# Patient Record
Sex: FEMALE | Race: WHITE | NOT HISPANIC OR LATINO | Employment: FULL TIME | URBAN - METROPOLITAN AREA
[De-identification: names, ages, dates, MRNs, and addresses within clinical notes are randomized per-mention and may not be internally consistent; named-entity substitution may affect disease eponyms.]

---

## 2017-02-02 PROBLEM — Q61.3 POLYCYSTIC KIDNEY DISEASE: Status: ACTIVE | Noted: 2017-02-02

## 2017-02-10 PROBLEM — F41.9 ANXIETY: Status: ACTIVE | Noted: 2017-02-10

## 2017-02-10 PROBLEM — I70.1 RENAL ARTERY STENOSIS: Status: ACTIVE | Noted: 2017-02-10

## 2017-02-10 PROBLEM — I15.0 RENOVASCULAR HYPERTENSION: Status: ACTIVE | Noted: 2017-02-10

## 2017-02-14 ENCOUNTER — PATIENT MESSAGE (OUTPATIENT)
Dept: CARDIOLOGY | Facility: CLINIC | Age: 57
End: 2017-02-14

## 2017-03-27 PROBLEM — I10 HTN (HYPERTENSION): Status: ACTIVE | Noted: 2017-03-27

## 2017-03-27 PROBLEM — R07.9 CHEST PAIN: Status: ACTIVE | Noted: 2017-03-27

## 2017-03-28 PROBLEM — K22.4 ESOPHAGEAL SPASM: Status: ACTIVE | Noted: 2017-03-28

## 2017-04-04 ENCOUNTER — PATIENT MESSAGE (OUTPATIENT)
Dept: CARDIOLOGY | Facility: CLINIC | Age: 57
End: 2017-04-04

## 2017-06-08 ENCOUNTER — OFFICE VISIT (OUTPATIENT)
Dept: CARDIOLOGY | Facility: CLINIC | Age: 57
End: 2017-06-08
Payer: COMMERCIAL

## 2017-06-08 VITALS
HEIGHT: 61 IN | BODY MASS INDEX: 36.21 KG/M2 | WEIGHT: 191.81 LBS | HEART RATE: 75 BPM | SYSTOLIC BLOOD PRESSURE: 145 MMHG | DIASTOLIC BLOOD PRESSURE: 91 MMHG

## 2017-06-08 DIAGNOSIS — Z82.49 FAMILY HISTORY OF CARDIOVASCULAR DISEASE: ICD-10-CM

## 2017-06-08 DIAGNOSIS — R10.13 EPIGASTRIC PAIN: ICD-10-CM

## 2017-06-08 DIAGNOSIS — R00.2 PALPITATIONS: ICD-10-CM

## 2017-06-08 DIAGNOSIS — I15.0 RENOVASCULAR HYPERTENSION: ICD-10-CM

## 2017-06-08 DIAGNOSIS — I70.1 RENAL ARTERY STENOSIS: ICD-10-CM

## 2017-06-08 DIAGNOSIS — R07.9 CHEST PAIN, UNSPECIFIED TYPE: Primary | ICD-10-CM

## 2017-06-08 PROCEDURE — 99204 OFFICE O/P NEW MOD 45 MIN: CPT | Mod: S$GLB,,, | Performed by: INTERNAL MEDICINE

## 2017-06-08 PROCEDURE — 99999 PR PBB SHADOW E&M-EST. PATIENT-LVL III: CPT | Mod: PBBFAC,,, | Performed by: INTERNAL MEDICINE

## 2017-06-08 RX ORDER — PANTOPRAZOLE SODIUM 40 MG/1
40 TABLET, DELAYED RELEASE ORAL DAILY
Qty: 30 TABLET | Refills: 11 | Status: SHIPPED | OUTPATIENT
Start: 2017-06-08 | End: 2018-06-22 | Stop reason: SDUPTHER

## 2017-06-08 NOTE — LETTER
June 8, 2017      Leigh Fuchs MD  [address] [place]  [place] [address]           [facility]  [address] [facility] [address] [place] [address]  Phone: [phone]          Patient: Jazmyne Stuart   MR Number: [id number]   YOB: 1960   Date of Visit: 6/8/2017       Dear Dr. Leigh Fuchs:    Thank you for referring Jazmyne Stuart to me for evaluation. Attached you will find relevant portions of my assessment and plan of care.    If you have questions, please do not hesitate to call me. I look forward to following Jazmyne Stuart along with you.    Sincerely,    Frederick Campbell MD    Enclosure  CC:  No Recipients    If you would like to receive this communication electronically, please contact [email or web address] or [phone] to request more information on [employer] Link access.    For providers and/or their staff who would like to refer a patient to [facility], please contact us through our one-stop-shop provider referral line, [facility] , at [phone].    If you feel you have received this communication in error or would no longer like to receive these types of communications, please e-mail [email or web address]

## 2017-06-08 NOTE — PROGRESS NOTES
Subjective:    Patient ID:  Jazmyne Stuart is a 57 y.o. female who presents for evaluation of Chest Pain (post hosp f/u - [facility] 03/2017; Family HX. ); Hypertension; and Leg Problem (cramps )      HPI   Referred here by Dr. Keith after hsop stay for CP and ?? MARIELA. No further CP since DC. Admits to palpitations occurring w/ and w/o exertion no syncope.  No triggers    Review of Systems   Constitution: Negative for chills, decreased appetite, weakness and night sweats.   HENT: Negative for headaches and nosebleeds.    Eyes: Negative for blurred vision and visual disturbance.   Cardiovascular: Negative for chest pain, claudication, cyanosis, dyspnea on exertion, irregular heartbeat, leg swelling, near-syncope, orthopnea, palpitations, paroxysmal nocturnal dyspnea and syncope.   Respiratory: Negative for cough and shortness of breath.    Endocrine: Negative for polyuria.   Hematologic/Lymphatic: Does not bruise/bleed easily.   Skin: Negative for flushing and rash.   Musculoskeletal: Negative for arthritis, joint pain, muscle cramps and myalgias.   Gastrointestinal: Negative for abdominal pain, change in bowel habit and heartburn.   Genitourinary: Negative for bladder incontinence.   Neurological: Negative for dizziness, light-headedness and loss of balance.   Psychiatric/Behavioral: Negative for altered mental status.        Objective:    Physical Exam   Constitutional: She is oriented to person, place, and time. She appears well-developed and well-nourished.   HENT:   Head: Normocephalic.   Eyes: Conjunctivae are normal.   Neck: Normal range of motion. Neck supple. No JVD present.   Cardiovascular: Normal rate, regular rhythm, normal heart sounds and intact distal pulses.    Pulses:       Carotid pulses are 2+ on the right side, and 2+ on the left side.       Radial pulses are 2+ on the right side, and 2+ on the left side.        Dorsalis pedis pulses are 2+ on the right side, and 2+ on the left side.        Posterior  tibial pulses are 2+ on the right side, and 2+ on the left side.   Pulmonary/Chest: Effort normal and breath sounds normal.   Abdominal: Soft. Bowel sounds are normal.   Musculoskeletal: She exhibits no edema or tenderness.   Neurological: She is alert and oriented to person, place, and time. Gait normal.   Skin: Skin is warm, dry and intact. No cyanosis. Nails show no clubbing.   Psychiatric: She has a normal mood and affect. Her speech is normal and behavior is normal. Thought content normal.   Nursing note and vitals reviewed.            ..    Chemistry        Component Value Date/Time     03/27/2017 1516     09/18/2015 0817    K 4.1 03/27/2017 1516    K 4.3 09/18/2015 0817     03/27/2017 1516     09/18/2015 0817    CO2 32 (H) 03/27/2017 1516    BUN 11 03/27/2017 1516    CREATININE 0.78 03/27/2017 1516    CREATININE 0.78 09/18/2015 0817    GLU 92 03/27/2017 1516        Component Value Date/Time    CALCIUM 10.0 03/27/2017 1516    ALKPHOS 83 03/27/2017 1516    AST 29 03/27/2017 1516    ALT 34 03/27/2017 1516    BILITOT 0.6 03/27/2017 1516            ..  Lab Results   Component Value Date    CHOL 209 (H) 03/27/2017    CHOL 196 09/18/2015     Lab Results   Component Value Date    HDL 70 03/27/2017    HDL 67 (H) 09/18/2015     Lab Results   Component Value Date    LDLCALC 122.2 03/27/2017    LDLCALC 118 09/18/2015     Lab Results   Component Value Date    TRIG 84 03/27/2017    TRIG 55 09/18/2015     Lab Results   Component Value Date    CHOLHDL 33.5 03/27/2017     ..  Lab Results   Component Value Date    WBC 6.78 03/27/2017    HGB 15.1 03/27/2017    HCT 46.0 03/27/2017    MCV 94 03/27/2017     03/27/2017       Test(s) Reviewed  I have reviewed the following in detail:  [x] Stress test   [] Angiography   [x] Echocardiogram   [x] Labs   [] Other:       Assessment:         ICD-10-CM ICD-9-CM   1. Chest pain, unspecified type R07.9 786.50   2. Renovascular hypertension I15.0 405.91   3.  Renal artery stenosis I70.1 440.1     Problem List Items Addressed This Visit     Chest pain - Primary    Renal artery stenosis    Overview     ?? US but negative CTA         Renovascular hypertension      Other Visit Diagnoses    None.          Plan:         Return to clinic 4 months   Aerobic exercise 5x's/wk. Heart healthy diet and risk factor modification.    See labs and med orders.  Event monitor since symptoms occur ~~ 2 weeks had negative hospital w/u.   Discussed avoidence of caffiene, nicotine, MSG, nasal decongestants, ETOH, dark chocolate etc and stimulant use.

## 2017-08-17 DIAGNOSIS — M25.571 ACUTE RIGHT ANKLE PAIN: Primary | ICD-10-CM

## 2017-08-22 ENCOUNTER — HOSPITAL ENCOUNTER (OUTPATIENT)
Dept: RADIOLOGY | Facility: HOSPITAL | Age: 57
Discharge: HOME | End: 2017-08-22
Attending: ORTHOPAEDIC SURGERY
Payer: COMMERCIAL

## 2017-08-22 ENCOUNTER — OFFICE VISIT (OUTPATIENT)
Dept: ORTHOPEDICS | Facility: CLINIC | Age: 57
End: 2017-08-22
Payer: COMMERCIAL

## 2017-08-22 VITALS
HEART RATE: 73 BPM | DIASTOLIC BLOOD PRESSURE: 93 MMHG | BODY MASS INDEX: 36.21 KG/M2 | HEIGHT: 61 IN | WEIGHT: 191.81 LBS | SYSTOLIC BLOOD PRESSURE: 156 MMHG

## 2017-08-22 DIAGNOSIS — M25.371 RIGHT ANKLE INSTABILITY: ICD-10-CM

## 2017-08-22 DIAGNOSIS — M25.571 ACUTE RIGHT ANKLE PAIN: ICD-10-CM

## 2017-08-22 PROCEDURE — 99244 OFF/OP CNSLTJ NEW/EST MOD 40: CPT | Mod: S$GLB,,, | Performed by: ORTHOPAEDIC SURGERY

## 2017-08-22 PROCEDURE — 73610 X-RAY EXAM OF ANKLE: CPT | Mod: 26,RT,, | Performed by: RADIOLOGY

## 2017-08-22 PROCEDURE — 99999 PR PBB SHADOW E&M-EST. PATIENT-LVL III: CPT | Mod: PBBFAC,,, | Performed by: ORTHOPAEDIC SURGERY

## 2017-08-22 PROCEDURE — 73610 X-RAY EXAM OF ANKLE: CPT | Mod: TC,PO,RT

## 2017-08-22 RX ORDER — GABAPENTIN 300 MG/1
300 CAPSULE ORAL 2 TIMES DAILY
Refills: 0 | COMMUNITY
Start: 2017-08-08 | End: 2017-10-03 | Stop reason: ALTCHOICE

## 2017-08-22 NOTE — LETTER
August 22, 2017      Kanu Toth, DPM  [address]  [address]  [facility]  [place] [address]           [facility]  [address] [facility] [address] [place] [address]  Phone: [phone]          Patient: Jazmyne Stuart   MR Number: [id number]   YOB: 1960   Date of Visit: 8/22/2017       Dear Dr. Kanu Toth:    Thank you for referring Jazmyne Stuart to me for evaluation. Attached you will find relevant portions of my assessment and plan of care.    If you have questions, please do not hesitate to call me. I look forward to following Jazmyne Stuart along with you.    Sincerely,    Mauro Tong MD    Enclosure  CC:  No Recipients    If you would like to receive this communication electronically, please contact [email or web address] or [phone] to request more information on [employer] Link access.    For providers and/or their staff who would like to refer a patient to [facility], please contact us through our one-stop-shop provider referral line, [facility] , at [phone].    If you feel you have received this communication in error or would no longer like to receive these types of communications, please e-mail [email or web address]

## 2017-08-22 NOTE — PROGRESS NOTES
"HPI: Jazmyne Stuart is a 57 y.o. female who was referred to me by Dr. Enrique Toth DPM and was seen in consultation today for right ankle pain. She rates her pain as 3/10 today. She says the pain began when she was snorkeling on vacation and she slipped on the rung of the ladder and her foot bent back. The pain is worse with walking and standing. She has tried ankle exercises, brace, cortisone injection and walking boot. She says it does give out. She had an MRI done but it is not available for review today.     PAST MEDICAL/SURGICAL/FAMILY/SOCIAL/ HISTORY: REVIEWED    ALLERGIES/MEDICATIONS: REVIEWED       Review of Systems:     Constitution: Negative.   HEENT: Negative.   Eyes: Negative.   Cardiovascular: Negative.   Respiratory: Negative.   Endocrine: Negative.   Hematologic/Lymphatic: Negative.   Skin: Negative.   Musculoskeletal: Positive for right ankle pain   Gastrointestinal: Negative.   Genitourinary: Negative.   Neurological: Negative.   Psychiatric/Behavioral: Negative.   Allergic/Immunologic: Negative.       PHYSICAL EXAM:  Vitals:    08/22/17 1351   BP: (!) 156/93   Pulse: 73     Ht Readings from Last 1 Encounters:   08/22/17 5' 1" (1.549 m)     Wt Readings from Last 1 Encounters:   08/22/17 87 kg (191 lb 12.8 oz)         GENERAL: Well developed, well nourished, no acute distress.  SKIN: Skin is intact. No atrophy, abrasions or lesions are noted.   Neurological: Normal mental status. Appropriate and conversant. Alert and oriented x 3.  GAIT: Walks with non-antalgic gait.    Right lower extremity compared with LLE:  2+ dorsalis pedis pulse.  Capillary refill < 3 seconds.  Normal range of motion tibiotalar and subtalar joints. pes planus.  5/5 strength EHL, FHL, tibialis anterior, gastrocsoleus, tibialis posterior and peroneals. Sensation to light touch intact sural, saphenous, superficial peroneal and deep peroneal nerves.  No swelling, no ecchymosis or deformity. No lymphadenopathy, no masses or " tumors palpated.  3+ anterior drawer test. non-tender to palpation.     XRAYS:   3 views of right ankle obtained and reviewed today reveal No evidence of fractures or dislocations.       ASSESSMENT:   Right ankle instability      PLAN:  I spent 20 minutes in consulation with the patient today. More than half the time was spent counseling the patient on her condition and the options for operative versus non-operative care.  She is going to drop off her MRI for me to review.   I recommend right ankle arthroscopy and brostom-coulter lateral ligament reconstruction. She is going to f/u with me after her vacation to schedule surgery.

## 2017-09-18 ENCOUNTER — TELEPHONE (OUTPATIENT)
Dept: ORTHOPEDICS | Facility: CLINIC | Age: 57
End: 2017-09-18

## 2017-09-18 NOTE — TELEPHONE ENCOUNTER
----- Message from Shobha Almazan sent at 9/18/2017  2:36 PM CDT -----  Contact: patient  Patient calling in regards to following up to see if the Doctor received her MRI on a disc. Please advise   Call back   Thanks!

## 2017-09-18 NOTE — TELEPHONE ENCOUNTER
Spoke to patient and advised that I will have Dr. Tong look at the MRI tomorrow when she returns to the clinic. Patient states understanding.

## 2017-09-21 ENCOUNTER — PATIENT MESSAGE (OUTPATIENT)
Dept: ORTHOPEDICS | Facility: CLINIC | Age: 57
End: 2017-09-21

## 2017-10-03 ENCOUNTER — OFFICE VISIT (OUTPATIENT)
Dept: ORTHOPEDICS | Facility: CLINIC | Age: 57
End: 2017-10-03
Payer: COMMERCIAL

## 2017-10-03 VITALS
BODY MASS INDEX: 36.06 KG/M2 | DIASTOLIC BLOOD PRESSURE: 104 MMHG | SYSTOLIC BLOOD PRESSURE: 172 MMHG | HEIGHT: 61 IN | HEART RATE: 75 BPM | WEIGHT: 191 LBS

## 2017-10-03 DIAGNOSIS — G89.29 CHRONIC PAIN OF RIGHT ANKLE: ICD-10-CM

## 2017-10-03 DIAGNOSIS — M25.571 CHRONIC PAIN OF RIGHT ANKLE: ICD-10-CM

## 2017-10-03 DIAGNOSIS — M25.371 RIGHT ANKLE INSTABILITY: Primary | ICD-10-CM

## 2017-10-03 PROCEDURE — 99214 OFFICE O/P EST MOD 30 MIN: CPT | Mod: S$GLB,,, | Performed by: ORTHOPAEDIC SURGERY

## 2017-10-03 PROCEDURE — 99999 PR PBB SHADOW E&M-EST. PATIENT-LVL III: CPT | Mod: PBBFAC,,, | Performed by: ORTHOPAEDIC SURGERY

## 2017-10-03 RX ORDER — DIPHENHYDRAMINE HCL 25 MG
50 CAPSULE ORAL NIGHTLY PRN
COMMUNITY
End: 2019-05-08

## 2017-10-03 NOTE — PROGRESS NOTES
"HPI: Jazmyne Stuart is a 57 y.o. female who was referred to me by Dr. Enrique Toth DPM and was seen in consultation today for right ankle pain. She rates her pain as 3/10 today. She says the pain began when she was snorkeling on vacation and she slipped on the rung of the ladder and her foot bent back. The pain is worse with walking and standing. She has tried ankle exercises, brace, cortisone injection and walking boot. She says it does give out.     PAST MEDICAL/SURGICAL/FAMILY/SOCIAL/ HISTORY: REVIEWED    ALLERGIES/MEDICATIONS: REVIEWED       Review of Systems:     Constitution: Negative.   HEENT: Negative.   Eyes: Negative.   Cardiovascular: Negative.   Respiratory: Negative.   Endocrine: Negative.   Hematologic/Lymphatic: Negative.   Skin: Negative.   Musculoskeletal: Positive for right ankle pain   Gastrointestinal: Negative.   Genitourinary: Negative.   Neurological: Negative.   Psychiatric/Behavioral: Negative.   Allergic/Immunologic: Negative.       PHYSICAL EXAM:  Vitals:    10/03/17 0858   BP: (!) 172/104   Pulse: 75     Ht Readings from Last 1 Encounters:   10/03/17 5' 1" (1.549 m)     Wt Readings from Last 1 Encounters:   10/03/17 86.6 kg (191 lb)         GENERAL: Well developed, well nourished, no acute distress.  SKIN: Skin is intact. No atrophy, abrasions or lesions are noted.   Neurological: Normal mental status. Appropriate and conversant. Alert and oriented x 3.  GAIT: Walks with non-antalgic gait.    Right lower extremity compared with LLE:  2+ dorsalis pedis pulse.  Capillary refill < 3 seconds.  Normal range of motion tibiotalar and subtalar joints. pes planus.  5/5 strength EHL, FHL, tibialis anterior, gastrocsoleus, tibialis posterior and peroneals. Sensation to light touch intact sural, saphenous, superficial peroneal and deep peroneal nerves.  No swelling, no ecchymosis or deformity. No lymphadenopathy, no masses or tumors palpated.  3+ anterior drawer test. non-tender to palpation. "     XRAYS:   3 views of right ankle obtained and reviewed today reveal No evidence of fractures or dislocations.       ASSESSMENT:   Right ankle instability      PLAN:  I spent 20 minutes in consulation with the patient today.  I reviewed her MRI which showed chronic tear of ATFL and mild osteoarthritis of the ankle. More than half the time was spent counseling the patient on her condition and the options for operative versus non-operative care.  I recommend right ankle arthroscopy and brostom-coulter lateral ligament reconstruction. I have explained the procedure, including indications, risks, and alternatives.  Jazmyne Stuart gave informed consent and is medically ready for surgery. She is going to call us with a date for surgery.

## 2017-10-04 ENCOUNTER — TELEPHONE (OUTPATIENT)
Dept: ORTHOPEDICS | Facility: CLINIC | Age: 57
End: 2017-10-04

## 2017-10-04 ENCOUNTER — PATIENT MESSAGE (OUTPATIENT)
Dept: ORTHOPEDICS | Facility: CLINIC | Age: 57
End: 2017-10-04

## 2017-10-04 RX ORDER — MUPIROCIN 20 MG/G
1 OINTMENT TOPICAL
Status: CANCELLED | OUTPATIENT
Start: 2017-10-04

## 2017-10-04 NOTE — TELEPHONE ENCOUNTER
----- Message from Ashley Hutchison sent at 10/4/2017 11:03 AM CDT -----  Contact: self   Placed call to pod, patient missed a call from your office. Please call back at [phone]

## 2017-10-09 PROBLEM — G89.29 CHRONIC PAIN OF RIGHT ANKLE: Status: ACTIVE | Noted: 2017-10-09

## 2017-10-09 PROBLEM — M25.571 CHRONIC PAIN OF RIGHT ANKLE: Status: ACTIVE | Noted: 2017-10-09

## 2017-10-17 DIAGNOSIS — M25.571 RIGHT ANKLE PAIN: Primary | ICD-10-CM

## 2017-10-20 ENCOUNTER — TELEPHONE (OUTPATIENT)
Dept: ORTHOPEDICS | Facility: CLINIC | Age: 57
End: 2017-10-20

## 2017-10-20 NOTE — TELEPHONE ENCOUNTER
----- Message from Brandi Castle sent at 10/20/2017  9:56 AM CDT -----  Contact: Shaila / /[facility] // [phone]  Needs orders with patient's name on it and clinicals.  Fax # is [phone].  Please call Shaila.

## 2017-10-20 NOTE — TELEPHONE ENCOUNTER
Spoke with jaquan at [facility], informed her an order for the knee  will be faxed over.

## 2017-10-23 ENCOUNTER — PATIENT MESSAGE (OUTPATIENT)
Dept: ORTHOPEDICS | Facility: CLINIC | Age: 57
End: 2017-10-23

## 2017-10-24 ENCOUNTER — ANESTHESIA EVENT (OUTPATIENT)
Dept: SURGERY | Facility: HOSPITAL | Age: 57
End: 2017-10-24
Payer: COMMERCIAL

## 2017-10-25 ENCOUNTER — PATIENT MESSAGE (OUTPATIENT)
Dept: SURGERY | Facility: HOSPITAL | Age: 57
End: 2017-10-25

## 2017-10-25 ENCOUNTER — SURGERY (OUTPATIENT)
Age: 57
End: 2017-10-25

## 2017-10-25 ENCOUNTER — ANESTHESIA (OUTPATIENT)
Dept: SURGERY | Facility: HOSPITAL | Age: 57
End: 2017-10-25
Payer: COMMERCIAL

## 2017-10-25 ENCOUNTER — TELEPHONE (OUTPATIENT)
Dept: ORTHOPEDICS | Facility: CLINIC | Age: 57
End: 2017-10-25

## 2017-10-25 ENCOUNTER — HOSPITAL ENCOUNTER (OUTPATIENT)
Facility: HOSPITAL | Age: 57
Discharge: HOME | End: 2017-10-25
Attending: ORTHOPAEDIC SURGERY | Admitting: ORTHOPAEDIC SURGERY
Payer: COMMERCIAL

## 2017-10-25 ENCOUNTER — PATIENT MESSAGE (OUTPATIENT)
Dept: ORTHOPEDICS | Facility: CLINIC | Age: 57
End: 2017-10-25

## 2017-10-25 DIAGNOSIS — M25.371 RIGHT ANKLE INSTABILITY: Primary | ICD-10-CM

## 2017-10-25 DIAGNOSIS — M25.571 CHRONIC PAIN OF RIGHT ANKLE: ICD-10-CM

## 2017-10-25 DIAGNOSIS — G89.29 CHRONIC PAIN OF RIGHT ANKLE: ICD-10-CM

## 2017-10-25 PROCEDURE — 37000008 HC ANESTHESIA 1ST 15 MINUTES: Mod: PO | Performed by: ORTHOPAEDIC SURGERY

## 2017-10-25 PROCEDURE — 36000709 HC OR TIME LEV III EA ADD 15 MIN: Mod: PO | Performed by: ORTHOPAEDIC SURGERY

## 2017-10-25 PROCEDURE — 63600175 PHARM REV CODE 636 W HCPCS: Mod: PO | Performed by: ANESTHESIOLOGY

## 2017-10-25 PROCEDURE — D9220A PRA ANESTHESIA: Mod: CRNA,,, | Performed by: NURSE ANESTHETIST, CERTIFIED REGISTERED

## 2017-10-25 PROCEDURE — 27698 REPAIR OF ANKLE LIGAMENT: CPT | Mod: RT,,, | Performed by: ORTHOPAEDIC SURGERY

## 2017-10-25 PROCEDURE — 27201423 OPTIME MED/SURG SUP & DEVICES STERILE SUPPLY: Mod: PO | Performed by: ORTHOPAEDIC SURGERY

## 2017-10-25 PROCEDURE — 25000003 PHARM REV CODE 250: Mod: PO | Performed by: ORTHOPAEDIC SURGERY

## 2017-10-25 PROCEDURE — 63600175 PHARM REV CODE 636 W HCPCS: Mod: PO | Performed by: NURSE ANESTHETIST, CERTIFIED REGISTERED

## 2017-10-25 PROCEDURE — 71000033 HC RECOVERY, INTIAL HOUR: Mod: PO | Performed by: ORTHOPAEDIC SURGERY

## 2017-10-25 PROCEDURE — 29897 ANKLE ARTHROSCOPY/SURGERY: CPT | Mod: 51,RT,, | Performed by: ORTHOPAEDIC SURGERY

## 2017-10-25 PROCEDURE — 63600175 PHARM REV CODE 636 W HCPCS: Mod: PO | Performed by: ORTHOPAEDIC SURGERY

## 2017-10-25 PROCEDURE — C1713 ANCHOR/SCREW BN/BN,TIS/BN: HCPCS | Mod: PO | Performed by: ORTHOPAEDIC SURGERY

## 2017-10-25 PROCEDURE — 25000003 PHARM REV CODE 250: Mod: PO | Performed by: ANESTHESIOLOGY

## 2017-10-25 PROCEDURE — 27200651 HC AIRWAY, LMA: Mod: PO | Performed by: NURSE ANESTHETIST, CERTIFIED REGISTERED

## 2017-10-25 PROCEDURE — 37000009 HC ANESTHESIA EA ADD 15 MINS: Mod: PO | Performed by: ORTHOPAEDIC SURGERY

## 2017-10-25 PROCEDURE — 71000039 HC RECOVERY, EACH ADD'L HOUR: Mod: PO | Performed by: ORTHOPAEDIC SURGERY

## 2017-10-25 PROCEDURE — D9220A PRA ANESTHESIA: Mod: ANES,,, | Performed by: ANESTHESIOLOGY

## 2017-10-25 PROCEDURE — 36000708 HC OR TIME LEV III 1ST 15 MIN: Mod: PO | Performed by: ORTHOPAEDIC SURGERY

## 2017-10-25 DEVICE — ANCHOR 2-0 FIBERWIRE 2.4X8.5MM: Type: IMPLANTABLE DEVICE | Site: ANKLE | Status: FUNCTIONAL

## 2017-10-25 DEVICE — LIGAMENT INTERNALBRACE: Type: IMPLANTABLE DEVICE | Site: ANKLE | Status: FUNCTIONAL

## 2017-10-25 RX ORDER — LIDOCAINE HYDROCHLORIDE 10 MG/ML
1 INJECTION, SOLUTION EPIDURAL; INFILTRATION; INTRACAUDAL; PERINEURAL ONCE
Status: COMPLETED | OUTPATIENT
Start: 2017-10-25 | End: 2017-10-25

## 2017-10-25 RX ORDER — LIDOCAINE HCL/PF 100 MG/5ML
SYRINGE (ML) INTRAVENOUS
Status: DISCONTINUED | OUTPATIENT
Start: 2017-10-25 | End: 2017-10-25

## 2017-10-25 RX ORDER — FENTANYL CITRATE 50 UG/ML
100 INJECTION, SOLUTION INTRAMUSCULAR; INTRAVENOUS ONCE
Status: COMPLETED | OUTPATIENT
Start: 2017-10-25 | End: 2017-10-25

## 2017-10-25 RX ORDER — MIDAZOLAM HYDROCHLORIDE 1 MG/ML
INJECTION, SOLUTION INTRAMUSCULAR; INTRAVENOUS
Status: DISCONTINUED | OUTPATIENT
Start: 2017-10-25 | End: 2017-10-25

## 2017-10-25 RX ORDER — SODIUM CHLORIDE, SODIUM LACTATE, POTASSIUM CHLORIDE, CALCIUM CHLORIDE 600; 310; 30; 20 MG/100ML; MG/100ML; MG/100ML; MG/100ML
INJECTION, SOLUTION INTRAVENOUS CONTINUOUS
Status: DISCONTINUED | OUTPATIENT
Start: 2017-10-25 | End: 2017-10-25 | Stop reason: HOSPADM

## 2017-10-25 RX ORDER — ACETAMINOPHEN 10 MG/ML
INJECTION, SOLUTION INTRAVENOUS
Status: DISCONTINUED | OUTPATIENT
Start: 2017-10-25 | End: 2017-10-25

## 2017-10-25 RX ORDER — FENTANYL CITRATE 50 UG/ML
INJECTION, SOLUTION INTRAMUSCULAR; INTRAVENOUS
Status: DISCONTINUED | OUTPATIENT
Start: 2017-10-25 | End: 2017-10-25

## 2017-10-25 RX ORDER — OXYCODONE AND ACETAMINOPHEN 10; 325 MG/1; MG/1
1 TABLET ORAL EVERY 4 HOURS PRN
Qty: 42 TABLET | Refills: 0 | Status: SHIPPED | OUTPATIENT
Start: 2017-10-25 | End: 2017-11-28

## 2017-10-25 RX ORDER — OXYCODONE HYDROCHLORIDE 5 MG/1
5 TABLET ORAL ONCE
Status: COMPLETED | OUTPATIENT
Start: 2017-10-25 | End: 2017-10-25

## 2017-10-25 RX ORDER — KETOROLAC TROMETHAMINE 30 MG/ML
INJECTION, SOLUTION INTRAMUSCULAR; INTRAVENOUS
Status: DISCONTINUED | OUTPATIENT
Start: 2017-10-25 | End: 2017-10-25

## 2017-10-25 RX ORDER — PROPOFOL 10 MG/ML
VIAL (ML) INTRAVENOUS
Status: DISCONTINUED | OUTPATIENT
Start: 2017-10-25 | End: 2017-10-25

## 2017-10-25 RX ORDER — MUPIROCIN 20 MG/G
1 OINTMENT TOPICAL
Status: COMPLETED | OUTPATIENT
Start: 2017-10-25 | End: 2017-10-25

## 2017-10-25 RX ORDER — ONDANSETRON 2 MG/ML
INJECTION INTRAMUSCULAR; INTRAVENOUS
Status: DISCONTINUED | OUTPATIENT
Start: 2017-10-25 | End: 2017-10-25

## 2017-10-25 RX ADMIN — MIDAZOLAM HYDROCHLORIDE 1 MG: 1 INJECTION, SOLUTION INTRAMUSCULAR; INTRAVENOUS at 07:10

## 2017-10-25 RX ADMIN — FENTANYL CITRATE 100 MCG: 50 INJECTION INTRAMUSCULAR; INTRAVENOUS at 09:10

## 2017-10-25 RX ADMIN — DEXTROSE 2 G: 50 INJECTION, SOLUTION INTRAVENOUS at 07:10

## 2017-10-25 RX ADMIN — LIDOCAINE HYDROCHLORIDE 100 MG: 20 INJECTION PARENTERAL at 07:10

## 2017-10-25 RX ADMIN — SODIUM CHLORIDE, SODIUM LACTATE, POTASSIUM CHLORIDE, AND CALCIUM CHLORIDE: .6; .31; .03; .02 INJECTION, SOLUTION INTRAVENOUS at 08:10

## 2017-10-25 RX ADMIN — OXYCODONE HYDROCHLORIDE 5 MG: 5 TABLET ORAL at 09:10

## 2017-10-25 RX ADMIN — KETOROLAC TROMETHAMINE 30 MG: 30 INJECTION, SOLUTION INTRAMUSCULAR; INTRAVENOUS at 08:10

## 2017-10-25 RX ADMIN — ACETAMINOPHEN 1000 MG: 10 INJECTION, SOLUTION INTRAVENOUS at 08:10

## 2017-10-25 RX ADMIN — ONDANSETRON 4 MG: 2 INJECTION, SOLUTION INTRAMUSCULAR; INTRAVENOUS at 08:10

## 2017-10-25 RX ADMIN — MUPIROCIN 1 G: 20 OINTMENT TOPICAL at 07:10

## 2017-10-25 RX ADMIN — PROPOFOL 200 MG: 10 INJECTION, EMULSION INTRAVENOUS at 07:10

## 2017-10-25 RX ADMIN — LIDOCAINE HYDROCHLORIDE: 10 INJECTION, SOLUTION EPIDURAL; INFILTRATION; INTRACAUDAL; PERINEURAL at 07:10

## 2017-10-25 RX ADMIN — SODIUM CHLORIDE, SODIUM LACTATE, POTASSIUM CHLORIDE, AND CALCIUM CHLORIDE: .6; .31; .03; .02 INJECTION, SOLUTION INTRAVENOUS at 07:10

## 2017-10-25 RX ADMIN — FENTANYL CITRATE 100 MCG: 50 INJECTION, SOLUTION INTRAMUSCULAR; INTRAVENOUS at 07:10

## 2017-10-25 NOTE — OR NURSING
"Dr. Fields at bedside evaluating patient's pain. Patient reports"mild" right ankle pain and mostly c/o right thigh pain. Bari noticed on right thigh from OR tourniquet.  "

## 2017-10-25 NOTE — ANESTHESIA PREPROCEDURE EVALUATION
10/25/2017  Jazmyne Stuart is a 57 y.o., female.    Anesthesia Evaluation    I have reviewed the Patient Summary Reports.    I have reviewed the Nursing Notes.   I have reviewed the Medications.     Review of Systems  Anesthesia Hx:  No problems with previous Anesthesia    Social:  Former Smoker, Social Alcohol Use    Cardiovascular:   Hypertension Denies CAD.    Denies CABG/stent.   Denies Angina. ECG has been reviewed. 3/2017 echo:    CONCLUSIONS     1 - Normal left ventricular systolic function (EF 55-60%).     2 - Left ventricular diastolic dysfunction.     3 - Normal right ventricular systolic function .     4 - The estimated PA systolic pressure is 21 mmHg.     5 - Mild tricuspid regurgitation.       3/28/17:  NEG NM perfusion scan   Renal/:   Chronic Renal Disease, renal hypertension Polycystic kidney disease   Musculoskeletal:   Arthritis     Psych:   anxiety NIMESH         Physical Exam  General:  Obesity    Airway/Jaw/Neck:  Airway Findings: Mouth Opening: Normal Mallampati: III  Improves to II with phonation.  TM Distance: 4 - 6 cm  Jaw/Neck Findings:  Neck ROM: Extension Decreased, Mild       Chest/Lungs:  Chest/Lungs Findings: Clear to auscultation, Normal Respiratory Rate     Heart/Vascular:  Heart Findings: Rate: Normal  Rhythm: Regular Rhythm        Mental Status:  Mental Status Findings:  Cooperative, Alert and Oriented         Anesthesia Plan  Type of Anesthesia, risks & benefits discussed:  Anesthesia Type:  general  Patient's Preference:   Intra-op Monitoring Plan: standard ASA monitors  Intra-op Monitoring Plan Comments:   Post Op Pain Control Plan: multimodal analgesia and peripheral nerve block  Post Op Pain Control Plan Comments:   Induction:   IV  Beta Blocker:  Patient is not currently on a Beta-Blocker (No further documentation required).       Informed Consent: Patient understands  risks and agrees with Anesthesia plan.  Questions answered. Anesthesia consent signed with patient.  ASA Score: 3     Day of Surgery Review of History & Physical: I have interviewed and examined the patient. I have reviewed the patient's H&P dated:  There are no significant changes.          Ready For Surgery From Anesthesia Perspective.

## 2017-10-25 NOTE — OP NOTE
OPERATIVE NOTE     DATE: 10/25/2017   TIME: 8:54 AM     PATIENT NAME: Jazmyne Stuart    PRE-OPERATIVE DIAGNOSIS: 1) Right lateral ankle instability 2) Right ankle chronic pain    POST-OPERATIVE DIAGNOSIS: 1) Right lateral ankle instability 2) Right ankle chronic pain    PROCEDURE: 1. Right ankle arthroscopy with limited debridement 2. Right Ankle Brostrom-Wallis lateral ligament reconstruction     SURGEON: Mauro Tong MD     ANESTHESIA TYPE: GETA     SPECIMENS SENT: NONE     COMPLICATIONS: NONE     BLOOD LOSS: <5cc     ASSISTANT: JAVIER Maddox             Procedure in detail: After appropriate informed consent was obtained. The patient was placed in the supine position on the operating table. The Right lower extremity was placed in the well leg bautista with the knee at 90 degrees. The Right lower extremity was then prepped and draped in the usual sterile fashion. Tourniquet was raised to 300 mmHg after esmarch exsanguination of the limb. The foot was placed in the ankle distractor and the ankle was distracted. The anteromedial portal for ankle arthroscopy was established using an 18 gauge spinal needle, injecting and aspirating back 10 cc of normal saline. A very small incision was made using a #11 blade just medial to the tibialis anterior tendon in line with the spinal needle insertion site through the skin only. A small hemostat was introduced and using to spread down to the capsule. The blunt trocar was placed into the joint. The camera was inserted and the ankle joint was inspected medially and laterally. There was good visualization of the joint with mild synovitis.   The anterolateral portal was established using 18 gauge spinal needle which was inserted into the lateral joint space while visualized using the camera. A very small incision as made using a #11 blade in line with the spinal needle insertion site through the skin only. The Arthrex 2.4 mm shaver was placed and used perform debridement of  synovitis within the ankle. No osteochondral defects were found. The camera was switched to the lateral portal where the joint was again inspected. Last look was made through the medial and lateral gutters and the posterior ankle and then the trocar and the cameras were removed.   The patient's leg was taken out of the well leg bautista.      Next, a 5 cm curvilinear incision was made using a #15 blade overlying the distal fibula. Attention was then turned to the anterior portion of the incision where the proximal edge of the inferior extensor retinaculum was then identified, carefully dissected and mobilized. The lateral ankle capsule was then identified along with the attenuated anterior talofibular ligament. The ligament was quite thin. The calcaneofibular ligament was identified at the tip of the distal fibula with inferior retraction of the peroneal tendons. The capsule was then divided from the distal fibula and extended about one centimeter proximally via sub periosteal elevation with a #15 blade.     The lateral ankle gutter and the lateral talar dome were also inspected for loose bodies and osteochondral injuries. Next, the distal tip of the fibula at the ATFL and CFL footprint was debrided with a curette and surgical blade, then a wronger was used to lightly decorticate the bone in order to provide a bleeding bony bed for healing and suture anchor placement. Using the Arthrex 1.8-mm drill two drill holes were then made at the ATFL and CFL insertion site on the distal fibula. Care was taken to make sure that the drill hole tunnels did not intersect with each other. Next drill hole was placed in the distal fibula for the 3.5 mm anchor for the internal brace and drill was placed in the lateral neck of the talus for the 4.75mm internal brace.  Two 2.4mm Mini Bio-SutureTak anchors were placed into the holes. The distal suture was placed into the CFL in a horizontal mattress fashion. The proximal suturewas placed  into the ATFL ligament using a horizontal mattress and these were both tied down with the foot in dorsiflexion and eversion.     The anchor for the internal brace was then placed into the lateral talar neck and a hemostat was placed under the suture to prevent overtightening.  The extensor retinaculum was then advanced and repaired to the periosteum of the distal fibula to reinforce the repair using interrupted 2.0 ethibond sutures. This is also done with the foot in neutral dorsiflexion and eversion.  The peroneal tendon sheath was repaired using 2.0 ethibone suture to prevent subluxation of the tendons.  The incision was irrigated with normal saline. Tourniquet was let down. Adequate hemostasis was achieved using bovie cautery. The deep layer of the incision was re-approximated using 0-monocyrl in an interrupted fashion. The subcutaneous layer was re-approximated using 3.0-monocyrl in an interrupted fashion. The skin was re-approximated using 3.0 nylon in a running fashion. Sterile dressing using xeroform, bacitracin, 4x4s, cast padding, and posterior splint and stirrups with the foot in neutral and eversion were placed. Patient tolerated the procedure well without complications. A well-padded posterior splint and stirrups with the foot in neutral and eversion was placed. I was present and scrubbed for the entire case.

## 2017-10-25 NOTE — PLAN OF CARE
Vss, jadyn po fluids, denies pain, patient reports has knee roller and crutches at home . IV removed, catheter intact. Discharge instructions provided and states understanding. States ready to go home.  Discharged from facility with family.

## 2017-10-25 NOTE — BRIEF OP NOTE
OPERATIVE NOTE     DATE: 10/25/2017   TIME: 8:54 AM     PATIENT NAME: Jazmyne Stuart    PRE-OPERATIVE DIAGNOSIS: 1) Right lateral ankle instability 2) Right ankle chronic pain    POST-OPERATIVE DIAGNOSIS: 1) Right lateral ankle instability 2) Right ankle chronic pain    PROCEDURE: 1) Right ankle arthroscopy with limited debridement 2) Right Ankle Brostrom-Wallis lateral ligament reconstruction     SURGEON: Mauro Tong MD     ANESTHESIA TYPE: GETA     SPECIMENS SENT: NONE     COMPLICATIONS: NONE     BLOOD LOSS: <5 cc     ASSISTANT: JAVIER Maddox

## 2017-10-25 NOTE — DISCHARGE SUMMARY
[facility]  Discharge Note  Short Stay    Admit Date: 10/25/2017    Discharge Date and Time: 10/25/2017 9:06 AM     Attending Physician: Mauro Tong MD     Discharge Provider: Mauro Tong    Diagnoses:  Active Hospital Problems    Diagnosis  POA    *Right ankle instability [M25.371]  Yes      Resolved Hospital Problems    Diagnosis Date Resolved POA   No resolved problems to display.       Discharged Condition: good    Hospital Course: Patient was admitted for an outpatient procedure and tolerated the procedure well with no complications.    Final Diagnoses: Same as principal problem.    Disposition: Home or Self Care    Follow up/Patient Instructions:    Medications:  Reconciled Home Medications:   Current Discharge Medication List      START taking these medications    Details   oxyCODONE-acetaminophen (PERCOCET)  mg per tablet Take 1 tablet by mouth every 4 (four) hours as needed for Pain.  Qty: 42 tablet, Refills: 0         CONTINUE these medications which have NOT CHANGED    Details   buPROPion (WELLBUTRIN) 75 MG tablet Take 75 mg by mouth Daily.      clonazePAM (KLONOPIN) 0.5 MG tablet Take 1 tablet by mouth  nightly as needed for  anxiety  Qty: 90 tablet, Refills: 0      diphenhydrAMINE (BENADRYL) 25 mg capsule Take 50 mg by mouth nightly as needed for Insomnia.      DIVIGEL 0.25 mg (0.1 %) GlPk       losartan (COZAAR) 100 MG tablet Take 1 tablet (100 mg total) by mouth once daily.  Qty: 30 tablet, Refills: 11    Associated Diagnoses: Essential hypertension      NAPROXEN SOD/DIPHENHYDRAMINE (ALEVE PM ORAL) Take 1 tablet by mouth daily as needed.      pantoprazole (PROTONIX) 40 MG tablet Take 1 tablet (40 mg total) by mouth once daily.  Qty: 30 tablet, Refills: 11    Associated Diagnoses: Epigastric pain      progesterone (PROMETRIUM) 100 MG capsule              Discharge Procedure Orders  CRUTCHES FOR HOME USE   Order Specific Question Answer Comments   Type: Axillary    Height:  "5' 1" (1.549 m)    Weight: 83.9 kg (185 lb)    Length of need (1-99 months): 2      Diet general     Call MD for:  temperature >100.4     Call MD for:  persistent nausea and vomiting     Call MD for:  severe uncontrolled pain     Call MD for:  difficulty breathing, headache or visual disturbances     Call MD for:  redness, tenderness, or signs of infection (pain, swelling, redness, odor or green/yellow discharge around incision site)     Call MD for:  hives     Call MD for:  persistent dizziness or light-headedness     Call MD for:  extreme fatigue     Keep surgical extremity elevated     No driving, operating heavy equipment or signing legal documents while taking pain medication     Non weight bearing     Leave dressing on - Keep it clean, dry, and intact until clinic visit       Follow-up Information     Mauro Tong MD On 11/7/2017.    Specialty:  Orthopedic Surgery  Contact information:  [address] [place] [address]  [phone]                   Discharge Procedure Orders (must include Diet, Follow-up, Activity):    Discharge Procedure Orders (must include Diet, Follow-up, Activity)  CRUTCHES FOR HOME USE   Order Specific Question Answer Comments   Type: Axillary    Height: 5' 1" (1.549 m)    Weight: 83.9 kg (185 lb)    Length of need (1-99 months): 2      Diet general     Call MD for:  temperature >100.4     Call MD for:  persistent nausea and vomiting     Call MD for:  severe uncontrolled pain     Call MD for:  difficulty breathing, headache or visual disturbances     Call MD for:  redness, tenderness, or signs of infection (pain, swelling, redness, odor or green/yellow discharge around incision site)     Call MD for:  hives     Call MD for:  persistent dizziness or light-headedness     Call MD for:  extreme fatigue     Keep surgical extremity elevated     No driving, operating heavy equipment or signing legal documents while taking pain medication     Non weight bearing     Leave dressing on " - Keep it clean, dry, and intact until clinic visit

## 2017-10-25 NOTE — ANESTHESIA POSTPROCEDURE EVALUATION
"Anesthesia Post Evaluation    Patient: Jazmyne Stuart    Procedure(s) Performed: Procedure(s) (LRB):  ARTHROSCOPY-ANKLE (Right)  REPAIR-LIGAMENT (brostrom coulter) (Right)    Final Anesthesia Type: general  Patient location during evaluation: PACU  Patient participation: Yes- Able to Participate  Level of consciousness: awake and alert and oriented  Post-procedure vital signs: reviewed and stable  Pain management: adequate  Airway patency: patent  PONV status at discharge: No PONV  Anesthetic complications: no      Cardiovascular status: hemodynamically stable  Respiratory status: unassisted, spontaneous ventilation and room air  Hydration status: euvolemic  Follow-up not needed.        Visit Vitals  BP (!) 146/77   Pulse 70   Temp 36.2 °C (97.2 °F) (Skin)   Resp 16   Ht 5' 1" (1.549 m)   Wt 83.9 kg (185 lb)   LMP  (Exact Date)   SpO2 100%   Breastfeeding? No   BMI 34.96 kg/m²       Pain/Tanmay Score: Pain Assessment Performed: Yes (10/25/2017  9:42 AM)  Presence of Pain: complains of pain/discomfort (10/25/2017  9:42 AM)  Pain Rating Prior to Med Admin: 5 (10/25/2017  9:20 AM)  Pain Rating Post Med Admin: 4 (10/25/2017  9:28 AM)  Tanmay Score: 10 (10/25/2017  9:42 AM)      "

## 2017-10-25 NOTE — H&P
"HPI: Jazmyne Stuart is a 57 y.o. female who was referred to me by Dr. Enrique Toth DPM and was seen in consultation today for right ankle pain. She rates her pain as 3/10 today. She says the pain began when she was snorkeling on vacation and she slipped on the rung of the ladder and her foot bent back. The pain is worse with walking and standing. She has tried ankle exercises, brace, cortisone injection and walking boot. She says it does give out.      PAST MEDICAL/SURGICAL/FAMILY/SOCIAL/ HISTORY: REVIEWED    ALLERGIES/MEDICATIONS: REVIEWED         Review of Systems:     Constitution: Negative.   HEENT: Negative.   Eyes: Negative.   Cardiovascular: Negative.   Respiratory: Negative.   Endocrine: Negative.   Hematologic/Lymphatic: Negative.   Skin: Negative.   Musculoskeletal: Positive for right ankle pain   Gastrointestinal: Negative.   Genitourinary: Negative.   Neurological: Negative.   Psychiatric/Behavioral: Negative.   Allergic/Immunologic: Negative.         PHYSICAL EXAM:      Vitals:     10/03/17 0858   BP: (!) 172/104   Pulse: 75          Ht Readings from Last 1 Encounters:   10/03/17 5' 1" (1.549 m)          Wt Readings from Last 1 Encounters:   10/03/17 86.6 kg (191 lb)            GENERAL: Well developed, well nourished, no acute distress.  SKIN: Skin is intact. No atrophy, abrasions or lesions are noted.   Neurological: Normal mental status. Appropriate and conversant. Alert and oriented x 3.  GAIT: Walks with non-antalgic gait.     Right lower extremity compared with LLE:  2+ dorsalis pedis pulse.  Capillary refill < 3 seconds.  Normal range of motion tibiotalar and subtalar joints. pes planus.  5/5 strength EHL, FHL, tibialis anterior, gastrocsoleus, tibialis posterior and peroneals. Sensation to light touch intact sural, saphenous, superficial peroneal and deep peroneal nerves.  No swelling, no ecchymosis or deformity. No lymphadenopathy, no masses or tumors palpated.  3+ anterior drawer test. " non-tender to palpation.      XRAYS:   3 views of right ankle obtained and reviewed today reveal No evidence of fractures or dislocations.         ASSESSMENT:   Right ankle instability       PLAN:  I spent 20 minutes in consulation with the patient today.  I reviewed her MRI which showed chronic tear of ATFL and mild osteoarthritis of the ankle. More than half the time was spent counseling the patient on her condition and the options for operative versus non-operative care.  I recommend right ankle arthroscopy and brostom-coulter lateral ligament reconstruction. I have explained the procedure, including indications, risks, and alternatives.  Jazmyne Stuart gave informed consent and is medically ready for surgery. She is going to call us with a date for surgery.

## 2017-10-25 NOTE — TELEPHONE ENCOUNTER
Jorge Rodriguez , I had surgery this morning , having nausea can I get something call in for that.

## 2017-10-25 NOTE — TRANSFER OF CARE
"Anesthesia Transfer of Care Note    Patient: Jazmyne Stuart    Procedure(s) Performed: Procedure(s) (LRB):  ARTHROSCOPY-ANKLE (Right)  REPAIR-LIGAMENT (brostrom coulter) (Right)    Patient location: PACU    Anesthesia Type: general    Transport from OR: Transported from OR on 2-3 L/min O2 by NC with adequate spontaneous ventilation    Post pain: adequate analgesia    Post assessment: no apparent anesthetic complications    Post vital signs: stable    Level of consciousness: awake and sedated    Nausea/Vomiting: no nausea/vomiting    Complications: none    Transfer of care protocol was followed      Last vitals:   Visit Vitals  /79 (BP Location: Right arm, Patient Position: Lying)   Pulse 81   Temp 36.6 °C (97.9 °F) (Skin)   Resp 18   Ht 5' 1" (1.549 m)   Wt 83.9 kg (185 lb)   LMP  (Exact Date)   SpO2 96%   Breastfeeding? No   BMI 34.96 kg/m²     "

## 2017-10-25 NOTE — DISCHARGE INSTRUCTIONS

## 2017-10-26 VITALS
BODY MASS INDEX: 34.93 KG/M2 | HEART RATE: 70 BPM | HEIGHT: 61 IN | RESPIRATION RATE: 16 BRPM | DIASTOLIC BLOOD PRESSURE: 77 MMHG | SYSTOLIC BLOOD PRESSURE: 146 MMHG | WEIGHT: 185 LBS | OXYGEN SATURATION: 100 % | TEMPERATURE: 97 F

## 2017-10-30 ENCOUNTER — TELEPHONE (OUTPATIENT)
Dept: ORTHOPEDICS | Facility: CLINIC | Age: 57
End: 2017-10-30

## 2017-10-30 NOTE — TELEPHONE ENCOUNTER
----- Message from Leticia Rodgers sent at 10/30/2017  4:11 PM CDT -----  Contact: Shaila/[facility]/[phone]  Shaila is requesting clinic notes post sx be faxed to [phone].

## 2017-11-09 DIAGNOSIS — M25.371 RIGHT ANKLE INSTABILITY: Primary | ICD-10-CM

## 2017-11-10 ENCOUNTER — HOSPITAL ENCOUNTER (OUTPATIENT)
Dept: RADIOLOGY | Facility: HOSPITAL | Age: 57
Discharge: HOME | End: 2017-11-10
Attending: ORTHOPAEDIC SURGERY
Payer: COMMERCIAL

## 2017-11-10 ENCOUNTER — OFFICE VISIT (OUTPATIENT)
Dept: ORTHOPEDICS | Facility: CLINIC | Age: 57
End: 2017-11-10
Payer: COMMERCIAL

## 2017-11-10 VITALS — HEIGHT: 61 IN | BODY MASS INDEX: 34.93 KG/M2 | WEIGHT: 185 LBS

## 2017-11-10 DIAGNOSIS — M25.571 CHRONIC PAIN OF RIGHT ANKLE: ICD-10-CM

## 2017-11-10 DIAGNOSIS — M25.371 RIGHT ANKLE INSTABILITY: Primary | ICD-10-CM

## 2017-11-10 DIAGNOSIS — G89.29 CHRONIC PAIN OF RIGHT ANKLE: ICD-10-CM

## 2017-11-10 DIAGNOSIS — M25.371 RIGHT ANKLE INSTABILITY: ICD-10-CM

## 2017-11-10 PROCEDURE — 73610 X-RAY EXAM OF ANKLE: CPT | Mod: 26,RT,, | Performed by: RADIOLOGY

## 2017-11-10 PROCEDURE — 29405 APPL SHORT LEG CAST: CPT | Mod: 58,RT,S$GLB, | Performed by: ORTHOPAEDIC SURGERY

## 2017-11-10 PROCEDURE — 73610 X-RAY EXAM OF ANKLE: CPT | Mod: TC,PO,RT

## 2017-11-10 PROCEDURE — 99024 POSTOP FOLLOW-UP VISIT: CPT | Mod: S$GLB,,, | Performed by: ORTHOPAEDIC SURGERY

## 2017-11-10 PROCEDURE — 99999 PR PBB SHADOW E&M-EST. PATIENT-LVL II: CPT | Mod: PBBFAC,,, | Performed by: ORTHOPAEDIC SURGERY

## 2017-11-10 NOTE — PROGRESS NOTES
Subjective:      Patient ID: Jazmyne Stuart is a 57 y.o. female.    Chief Complaint: Post-op Evaluation of the Right Ankle and Post-op Evaluation (s/p ankle ATS with debridement; vin coulter 10/25/17)    Doing well today. She rates her pain as 0/10 today.   Social History     Occupational History    Not on file.     Social History Main Topics    Smoking status: Former Smoker     Packs/day: 0.50     Quit date: 1/10/2017    Smokeless tobacco: Never Used    Alcohol use 0.6 oz/week     1 Cans of beer per week    Drug use: Unknown    Sexual activity: Not on file            Objective:    Ortho Exam     Neurovascularly intact, incisions well healed, mild swelling, sutures are intact.  No signs of infection.    Assessment:       1. Right ankle instability    2. Chronic pain of right ankle          Plan:       Short leg cast applied. Non-weightbearing. F/u 2 weeks, no  Xray.

## 2017-11-15 ENCOUNTER — PATIENT MESSAGE (OUTPATIENT)
Dept: ORTHOPEDICS | Facility: CLINIC | Age: 57
End: 2017-11-15

## 2017-11-28 ENCOUNTER — OFFICE VISIT (OUTPATIENT)
Dept: ORTHOPEDICS | Facility: CLINIC | Age: 57
End: 2017-11-28
Payer: COMMERCIAL

## 2017-11-28 VITALS
WEIGHT: 185 LBS | HEART RATE: 68 BPM | BODY MASS INDEX: 34.93 KG/M2 | SYSTOLIC BLOOD PRESSURE: 158 MMHG | HEIGHT: 61 IN | DIASTOLIC BLOOD PRESSURE: 94 MMHG

## 2017-11-28 DIAGNOSIS — M25.371 RIGHT ANKLE INSTABILITY: Primary | ICD-10-CM

## 2017-11-28 DIAGNOSIS — G89.29 CHRONIC PAIN OF RIGHT ANKLE: ICD-10-CM

## 2017-11-28 DIAGNOSIS — M25.571 CHRONIC PAIN OF RIGHT ANKLE: ICD-10-CM

## 2017-11-28 PROCEDURE — 99999 PR PBB SHADOW E&M-EST. PATIENT-LVL III: CPT | Mod: PBBFAC,,, | Performed by: ORTHOPAEDIC SURGERY

## 2017-11-28 PROCEDURE — 99024 POSTOP FOLLOW-UP VISIT: CPT | Mod: S$GLB,,, | Performed by: ORTHOPAEDIC SURGERY

## 2017-11-28 PROCEDURE — 97760 ORTHOTIC MGMT&TRAING 1ST ENC: CPT | Mod: S$GLB,,, | Performed by: ORTHOPAEDIC SURGERY

## 2017-12-04 NOTE — PROGRESS NOTES
Subjective:      Patient ID: Jazmyne Stuart is a 57 y.o. female.    Chief Complaint: Post-op Evaluation of the Right Ankle (DOS: 10-25-17/Ankle ATS; with debridement; vin coulter )    Doing very well today. She rates her pain as 0/10 today.   Social History     Occupational History    Not on file.     Social History Main Topics    Smoking status: Former Smoker     Packs/day: 0.50     Quit date: 1/10/2017    Smokeless tobacco: Never Used    Alcohol use 0.6 oz/week     1 Cans of beer per week    Drug use: Unknown    Sexual activity: Not on file            Objective:    Ortho Exam     Neurovascularly intact, incisions well healed, mild swelling.  No signs of infection. Negative anterior drawer test.     Assessment:       1. Right ankle instability    2. Chronic pain of right ankle          Plan:       We performed a custom orthotic/brace adjustment, fitting and training with the patient today. The patient demonstrated understanding and proper care. This was performed for 15 minutes.  Short boot was given.  PT 2/6.   F/u 2 weeks, no  Xray.

## 2017-12-12 ENCOUNTER — OFFICE VISIT (OUTPATIENT)
Dept: ORTHOPEDICS | Facility: CLINIC | Age: 57
End: 2017-12-12
Payer: COMMERCIAL

## 2017-12-12 VITALS
BODY MASS INDEX: 34.93 KG/M2 | WEIGHT: 185 LBS | DIASTOLIC BLOOD PRESSURE: 92 MMHG | HEIGHT: 61 IN | HEART RATE: 82 BPM | SYSTOLIC BLOOD PRESSURE: 147 MMHG

## 2017-12-12 DIAGNOSIS — M25.371 RIGHT ANKLE INSTABILITY: Primary | ICD-10-CM

## 2017-12-12 DIAGNOSIS — G89.29 CHRONIC PAIN OF RIGHT ANKLE: ICD-10-CM

## 2017-12-12 DIAGNOSIS — M25.571 CHRONIC PAIN OF RIGHT ANKLE: ICD-10-CM

## 2017-12-12 PROCEDURE — 99024 POSTOP FOLLOW-UP VISIT: CPT | Mod: S$GLB,,, | Performed by: ORTHOPAEDIC SURGERY

## 2017-12-12 PROCEDURE — 99999 PR PBB SHADOW E&M-EST. PATIENT-LVL III: CPT | Mod: PBBFAC,,, | Performed by: ORTHOPAEDIC SURGERY

## 2017-12-12 NOTE — PROGRESS NOTES
Subjective:      Patient ID: Jazmyne Stuart is a 57 y.o. female.    Chief Complaint: Post-op Evaluation of the Right Ankle and Post-op Evaluation (s/p ankle ATS with debridement; vin coulter 10/25/17)    Doing very well today. She rates her pain as 0/10 today. She is doing well with PT.   Social History     Occupational History    Not on file.     Social History Main Topics    Smoking status: Former Smoker     Packs/day: 0.50     Quit date: 1/10/2017    Smokeless tobacco: Never Used    Alcohol use 0.6 oz/week     1 Cans of beer per week    Drug use: Unknown    Sexual activity: Not on file            Objective:    Ortho Exam     RLE: Neurovascularly intact, incisions well healed, mild swelling.  No signs of infection. 1+ anterior drawer test which is comparable with the left ankle.    Assessment:       1. Right ankle instability    2. Chronic pain of right ankle          Plan:     Lace up ankle brace was given. Ok to transition out of the boot. F/u 4 weeks, no  Xray.

## 2018-01-12 ENCOUNTER — PATIENT MESSAGE (OUTPATIENT)
Dept: ORTHOPEDICS | Facility: CLINIC | Age: 58
End: 2018-01-12

## 2018-01-15 ENCOUNTER — PATIENT MESSAGE (OUTPATIENT)
Dept: ORTHOPEDICS | Facility: CLINIC | Age: 58
End: 2018-01-15

## 2018-01-19 ENCOUNTER — PATIENT MESSAGE (OUTPATIENT)
Dept: ORTHOPEDICS | Facility: CLINIC | Age: 58
End: 2018-01-19

## 2018-01-31 ENCOUNTER — OFFICE VISIT (OUTPATIENT)
Dept: ORTHOPEDICS | Facility: CLINIC | Age: 58
End: 2018-01-31
Payer: COMMERCIAL

## 2018-01-31 VITALS
BODY MASS INDEX: 34.93 KG/M2 | SYSTOLIC BLOOD PRESSURE: 154 MMHG | HEIGHT: 61 IN | HEART RATE: 88 BPM | WEIGHT: 185 LBS | DIASTOLIC BLOOD PRESSURE: 96 MMHG

## 2018-01-31 DIAGNOSIS — M25.571 CHRONIC PAIN OF RIGHT ANKLE: Primary | ICD-10-CM

## 2018-01-31 DIAGNOSIS — M25.371 RIGHT ANKLE INSTABILITY: ICD-10-CM

## 2018-01-31 DIAGNOSIS — G89.29 CHRONIC PAIN OF RIGHT ANKLE: Primary | ICD-10-CM

## 2018-01-31 PROCEDURE — 99213 OFFICE O/P EST LOW 20 MIN: CPT | Mod: S$GLB,,, | Performed by: ORTHOPAEDIC SURGERY

## 2018-01-31 PROCEDURE — 3008F BODY MASS INDEX DOCD: CPT | Mod: S$GLB,,, | Performed by: ORTHOPAEDIC SURGERY

## 2018-01-31 PROCEDURE — 99999 PR PBB SHADOW E&M-EST. PATIENT-LVL III: CPT | Mod: PBBFAC,,, | Performed by: ORTHOPAEDIC SURGERY

## 2018-01-31 RX ORDER — TRAMADOL HYDROCHLORIDE 50 MG/1
50 TABLET ORAL EVERY 6 HOURS PRN
Qty: 20 TABLET | Refills: 0 | Status: SHIPPED | OUTPATIENT
Start: 2018-01-31 | End: 2018-02-10

## 2018-01-31 NOTE — PROGRESS NOTES
Subjective:      Patient ID: Jazmyne Stuart is a 57 y.o. female.    Chief Complaint: Ankle Pain (R ankle 4wk f/u)    Doing okay. She rates her pain as 2/10 today. She is doing well with PT. she had an ankle arthroscopy and Broström Gold.  Pain over the dorsal lateral ankle.  Hurts over the extensor tendons.  Pain at the end of the day and after being on her feet for a while.  She can't wear the brace due to wear rubs.    Social History     Occupational History    Not on file.     Social History Main Topics    Smoking status: Former Smoker     Packs/day: 0.50     Quit date: 1/10/2017    Smokeless tobacco: Never Used    Alcohol use 0.6 oz/week     1 Cans of beer per week    Drug use: Unknown    Sexual activity: Not on file            Objective:    Ortho Exam     RLE: Neurovascularly intact, incisions well healed, mild swelling.  No signs of infection. 1+ anterior drawer test which is comparable with the left ankle.    Assessment:       Status post right ankle arthroscopy with Carey Wallis.      Plan:    continue with the physical therapy.  I encouraged her that some pain and swelling is normal at this point.  I gave her a small prescription of Ultram to have with her on her cruise.  Follow-up in 6 weeks with Dr. Tong.

## 2018-02-14 ENCOUNTER — PATIENT MESSAGE (OUTPATIENT)
Dept: ORTHOPEDICS | Facility: CLINIC | Age: 58
End: 2018-02-14

## 2018-03-02 ENCOUNTER — HOSPITAL ENCOUNTER (OUTPATIENT)
Dept: RADIOLOGY | Facility: HOSPITAL | Age: 58
Discharge: HOME | End: 2018-03-02
Attending: ORTHOPAEDIC SURGERY
Payer: COMMERCIAL

## 2018-03-02 ENCOUNTER — OFFICE VISIT (OUTPATIENT)
Dept: ORTHOPEDICS | Facility: CLINIC | Age: 58
End: 2018-03-02
Payer: COMMERCIAL

## 2018-03-02 VITALS
HEART RATE: 72 BPM | HEIGHT: 61 IN | SYSTOLIC BLOOD PRESSURE: 161 MMHG | DIASTOLIC BLOOD PRESSURE: 81 MMHG | WEIGHT: 185 LBS | BODY MASS INDEX: 34.93 KG/M2

## 2018-03-02 DIAGNOSIS — G89.29 CHRONIC PAIN OF RIGHT ANKLE: ICD-10-CM

## 2018-03-02 DIAGNOSIS — G89.29 CHRONIC PAIN OF RIGHT ANKLE: Primary | ICD-10-CM

## 2018-03-02 DIAGNOSIS — M25.571 CHRONIC PAIN OF RIGHT ANKLE: Primary | ICD-10-CM

## 2018-03-02 DIAGNOSIS — M25.571 CHRONIC PAIN OF RIGHT ANKLE: ICD-10-CM

## 2018-03-02 PROCEDURE — 99999 PR PBB SHADOW E&M-EST. PATIENT-LVL III: CPT | Mod: PBBFAC,,, | Performed by: ORTHOPAEDIC SURGERY

## 2018-03-02 PROCEDURE — 3079F DIAST BP 80-89 MM HG: CPT | Mod: S$GLB,,, | Performed by: ORTHOPAEDIC SURGERY

## 2018-03-02 PROCEDURE — 3077F SYST BP >= 140 MM HG: CPT | Mod: S$GLB,,, | Performed by: ORTHOPAEDIC SURGERY

## 2018-03-02 PROCEDURE — 20605 DRAIN/INJ JOINT/BURSA W/O US: CPT | Mod: RT,S$GLB,, | Performed by: ORTHOPAEDIC SURGERY

## 2018-03-02 PROCEDURE — 73610 X-RAY EXAM OF ANKLE: CPT | Mod: TC,PO,RT

## 2018-03-02 PROCEDURE — 73610 X-RAY EXAM OF ANKLE: CPT | Mod: 26,RT,, | Performed by: RADIOLOGY

## 2018-03-02 PROCEDURE — 99214 OFFICE O/P EST MOD 30 MIN: CPT | Mod: 25,S$GLB,, | Performed by: ORTHOPAEDIC SURGERY

## 2018-03-02 RX ADMIN — TRIAMCINOLONE ACETONIDE 40 MG: 40 INJECTION, SUSPENSION INTRA-ARTICULAR; INTRAMUSCULAR at 10:03

## 2018-03-05 RX ORDER — TRIAMCINOLONE ACETONIDE 40 MG/ML
40 INJECTION, SUSPENSION INTRA-ARTICULAR; INTRAMUSCULAR
Status: DISCONTINUED | OUTPATIENT
Start: 2018-03-02 | End: 2018-03-05 | Stop reason: HOSPADM

## 2018-03-05 NOTE — PROCEDURES
Intermediate Joint Aspiration/Injection  Date/Time: 3/2/2018 10:06 AM  Performed by: MICHEL ROSAS  Authorized by: MICHEL ROSAS     Consent Done?: Yes (Verbal)  Indications: Pain and joint swelling  Site marked: The procedure site was marked      Location:  Ankle  Site:  R ankle  Prep: Patient was prepped and draped in usual sterile fashion    Needle size:  22 G  Approach:  Anteromedial  Medications:  40 mg triamcinolone acetonide 40 mg/mL  Patient tolerance:  Patient tolerated the procedure well with no immediate complications

## 2018-03-05 NOTE — PROGRESS NOTES
Subjective:      Patient ID: Jazmyne Stuart is a 57 y.o. female.    Chief Complaint: Post-op Evaluation of the Left Ankle and Post-op Evaluation (s/p ATS with debridement; Be coulter 10/25/17)    Doing very well today. She rates her pain as 6/10 today. She is doing PT exercises.  She has increased pain in the evenings.  It has started worsening in the last month. Her ankle no longer gives out.   Social History     Occupational History    Not on file.     Social History Main Topics    Smoking status: Former Smoker     Packs/day: 0.50     Quit date: 1/10/2017    Smokeless tobacco: Never Used    Alcohol use 0.6 oz/week     1 Cans of beer per week    Drug use: Unknown    Sexual activity: Not on file            Objective:    Ortho Exam     RLE: Neurovascularly intact, incisions well healed, mild swelling.  No signs of infection. 1+ anterior drawer test which is comparable with the left ankle.    Assessment:       1. Chronic pain of right ankle          Plan:     I injected the right ankle today. We discussed osteoarthritis of the ankle and symptomatic treatment. I can repeat injections every 4-6 months or prn as needed.  F/u prn.

## 2018-03-06 ENCOUNTER — PATIENT MESSAGE (OUTPATIENT)
Dept: ORTHOPEDICS | Facility: CLINIC | Age: 58
End: 2018-03-06

## 2018-04-10 DIAGNOSIS — M25.552 PAIN OF BOTH HIP JOINTS: Primary | ICD-10-CM

## 2018-04-10 DIAGNOSIS — M25.551 PAIN OF BOTH HIP JOINTS: Primary | ICD-10-CM

## 2018-04-12 ENCOUNTER — HOSPITAL ENCOUNTER (OUTPATIENT)
Dept: RADIOLOGY | Facility: HOSPITAL | Age: 58
Discharge: HOME | End: 2018-04-12
Attending: ORTHOPAEDIC SURGERY
Payer: COMMERCIAL

## 2018-04-12 ENCOUNTER — OFFICE VISIT (OUTPATIENT)
Dept: ORTHOPEDICS | Facility: CLINIC | Age: 58
End: 2018-04-12
Payer: COMMERCIAL

## 2018-04-12 VITALS — BODY MASS INDEX: 34.93 KG/M2 | WEIGHT: 185 LBS | HEIGHT: 61 IN

## 2018-04-12 DIAGNOSIS — Z96.642 STATUS POST TOTAL REPLACEMENT OF LEFT HIP: ICD-10-CM

## 2018-04-12 DIAGNOSIS — M16.12 PRIMARY OSTEOARTHRITIS OF LEFT HIP: ICD-10-CM

## 2018-04-12 DIAGNOSIS — M25.552 LEFT HIP PAIN: Primary | ICD-10-CM

## 2018-04-12 DIAGNOSIS — M25.551 PAIN OF BOTH HIP JOINTS: ICD-10-CM

## 2018-04-12 DIAGNOSIS — M25.552 PAIN OF BOTH HIP JOINTS: ICD-10-CM

## 2018-04-12 PROCEDURE — 20610 DRAIN/INJ JOINT/BURSA W/O US: CPT | Mod: LT,S$GLB,, | Performed by: ORTHOPAEDIC SURGERY

## 2018-04-12 PROCEDURE — 73502 X-RAY EXAM HIP UNI 2-3 VIEWS: CPT | Mod: 26,,, | Performed by: RADIOLOGY

## 2018-04-12 PROCEDURE — 99999 PR PBB SHADOW E&M-EST. PATIENT-LVL II: CPT | Mod: PBBFAC,,, | Performed by: ORTHOPAEDIC SURGERY

## 2018-04-12 PROCEDURE — 99214 OFFICE O/P EST MOD 30 MIN: CPT | Mod: 25,S$GLB,, | Performed by: ORTHOPAEDIC SURGERY

## 2018-04-12 PROCEDURE — 73502 X-RAY EXAM HIP UNI 2-3 VIEWS: CPT | Mod: TC,PO

## 2018-04-12 RX ORDER — TRIAMCINOLONE ACETONIDE 40 MG/ML
80 INJECTION, SUSPENSION INTRA-ARTICULAR; INTRAMUSCULAR
Status: DISCONTINUED | OUTPATIENT
Start: 2018-04-12 | End: 2018-04-12 | Stop reason: HOSPADM

## 2018-04-12 RX ADMIN — TRIAMCINOLONE ACETONIDE 80 MG: 40 INJECTION, SUSPENSION INTRA-ARTICULAR; INTRAMUSCULAR at 03:04

## 2018-04-12 NOTE — PROGRESS NOTES
Jazmyne Stuart, 57 years old, complaining of pain in her left hip, had this now   dating back to .  The hip replacements at elsewhere.  She is stating that it   did make her life better, but still has some occasional pain.  Hip is   bothersome if she has been seated for a long time and it goes when she stand up.    Also, prolonged walking.  Points to the greater troch as the location of her   pain.  She also has pain lying on that side at nighttime.  Pain is 4/10 on the   pain scale.    Exam shows hip range of motion is painless.  She is tender in the greater   trochanter, also tender in the lower lumbar spine area.    X-rays show well-placed components from an anterior approach.    ASSESSMENT:  Status post left hip replacement six years ago with discomfort.    PLAN:  We will get her set up with physical therapy.  We will also do an   injection in the region of the greater trochanter of the left hip.  We will see   her back as needed.      PBB/HN  dd: 2018 15:21:33 (CDT)  td: 2018 10:37:24 (CDT)  Doc ID   #[id number]  Job ID #[id number]    CC:     Further History  Aching pain  Worse with activity  Relieved with rest  No other associated symptoms  No other radiation    Further Exam  Alert and oriented  Pleasant  Contralateral limb has appropriate range of motion for age and condition  Contralateral limb has appropriate strength for age and condition  Contralateral limb has appropriate stability  for age and condition  No adenopathy  Pulses are appropriate for current condition  Skin is intact        Chief Complaint    Chief Complaint   Patient presents with    Left Hip - Pain       HPI  Jazmyne Stuart is a 57 y.o.  female who presents with       Past Medical History  Past Medical History:   Diagnosis Date    Hip arthritis     Hypertension     Postmenopausal hormone replacement therapy        Past Surgical History  Past Surgical History:   Procedure Laterality Date     SECTION      ENDOMETRIAL  ABLATION      HIP SURGERY      JOINT REPLACEMENT Left     Total hip replacement    TUBAL LIGATION         Medications  Current Outpatient Prescriptions   Medication Sig    buPROPion (WELLBUTRIN) 75 MG tablet Take 75 mg by mouth Daily.    clonazePAM (KLONOPIN) 0.5 MG tablet TAKE 1 TABLET BY MOUTH  NIGHTLY AS NEEDED FOR  ANXIETY    diphenhydrAMINE (BENADRYL) 25 mg capsule Take 50 mg by mouth nightly as needed for Insomnia.    DIVIGEL 0.25 mg (0.1 %) GlPk     NAPROXEN SOD/DIPHENHYDRAMINE (ALEVE PM ORAL) Take 1 tablet by mouth daily as needed.    pantoprazole (PROTONIX) 40 MG tablet Take 1 tablet (40 mg total) by mouth once daily.    progesterone (PROMETRIUM) 100 MG capsule     losartan (COZAAR) 100 MG tablet Take 1 tablet (100 mg total) by mouth once daily.     No current facility-administered medications for this visit.        Allergies  Review of patient's allergies indicates:  No Known Allergies    Family History  Family History   Problem Relation Age of Onset    Anxiety disorder Mother     Heart disease Mother     Hypertension Mother     Anxiety disorder Father     Anesthesia problems Neg Hx     Clotting disorder Neg Hx        Social History  Social History     Social History    Marital status:      Spouse name: N/A    Number of children: N/A    Years of education: N/A     Occupational History    Not on file.     Social History Main Topics    Smoking status: Former Smoker     Packs/day: 0.50     Quit date: 1/10/2017    Smokeless tobacco: Never Used    Alcohol use 0.6 oz/week     1 Cans of beer per week    Drug use: Unknown    Sexual activity: Not on file     Other Topics Concern    Not on file     Social History Narrative    No narrative on file               Review of Systems     Constitutional: Negative    HENT: Negative  Eyes: Negative  Respiratory: Negative  Cardiovascular: Negative  Musculoskeletal: HPI  Skin: Negative  Neurological: Negative  Hematological:  Negative  Endocrine: Negative                 Physical Exam    There were no vitals filed for this visit.  Body mass index is 34.96 kg/m².  Physical Examination:     General appearance -  well appearing, and in no distress  Mental status - awake  Neck - supple  Chest -  symmetric air entry  Heart - normal rate   Abdomen - soft      Assessment     1. Left hip pain    2. Primary osteoarthritis of left hip    3. Status post total replacement of left hip          Plan

## 2018-04-12 NOTE — PROCEDURES
Large Joint Aspiration/Injection  Date/Time: 4/12/2018 3:22 PM  Performed by: BRITTNEY BLACKWOOD  Authorized by: BRITTNEY BLACKWOOD     Consent Done?:  Yes (Verbal)  Indications:  Pain  Procedure site marked: Yes    Timeout: Prior to procedure the correct patient, procedure, and site was verified      Location:  Hip  Site:  L greater trochanteric bursa  Prep: Patient was prepped and draped in usual sterile fashion    Ultrasonic Guidance for needle placement: No  Needle size:  22 G  Approach:  Posterior  Medications:  80 mg triamcinolone acetonide 40 mg/mL  Patient tolerance:  Patient tolerated the procedure well with no immediate complications

## 2018-06-20 DIAGNOSIS — G89.29 CHRONIC PAIN OF RIGHT ANKLE: Primary | ICD-10-CM

## 2018-06-20 DIAGNOSIS — M25.571 CHRONIC PAIN OF RIGHT ANKLE: Primary | ICD-10-CM

## 2018-06-21 ENCOUNTER — TELEPHONE (OUTPATIENT)
Dept: ORTHOPEDICS | Facility: CLINIC | Age: 58
End: 2018-06-21

## 2018-06-21 ENCOUNTER — OFFICE VISIT (OUTPATIENT)
Dept: ORTHOPEDICS | Facility: CLINIC | Age: 58
End: 2018-06-21
Payer: COMMERCIAL

## 2018-06-21 ENCOUNTER — HOSPITAL ENCOUNTER (OUTPATIENT)
Dept: RADIOLOGY | Facility: HOSPITAL | Age: 58
Discharge: HOME | End: 2018-06-21
Attending: ORTHOPAEDIC SURGERY
Payer: COMMERCIAL

## 2018-06-21 VITALS
DIASTOLIC BLOOD PRESSURE: 101 MMHG | SYSTOLIC BLOOD PRESSURE: 166 MMHG | BODY MASS INDEX: 35.68 KG/M2 | WEIGHT: 189 LBS | HEIGHT: 61 IN | HEART RATE: 73 BPM

## 2018-06-21 DIAGNOSIS — M79.674 CHRONIC PAIN OF TOE OF RIGHT FOOT: ICD-10-CM

## 2018-06-21 DIAGNOSIS — M19.071 ARTHRITIS OF RIGHT FOOT: ICD-10-CM

## 2018-06-21 DIAGNOSIS — G89.29 CHRONIC PAIN OF RIGHT ANKLE: Primary | ICD-10-CM

## 2018-06-21 DIAGNOSIS — G89.29 CHRONIC PAIN OF TOE OF RIGHT FOOT: ICD-10-CM

## 2018-06-21 DIAGNOSIS — M25.571 CHRONIC PAIN OF RIGHT ANKLE: Primary | ICD-10-CM

## 2018-06-21 DIAGNOSIS — M25.571 RIGHT ANKLE PAIN: ICD-10-CM

## 2018-06-21 DIAGNOSIS — M25.571 CHRONIC PAIN OF RIGHT ANKLE: ICD-10-CM

## 2018-06-21 DIAGNOSIS — G89.29 CHRONIC PAIN OF RIGHT ANKLE: ICD-10-CM

## 2018-06-21 PROCEDURE — 3077F SYST BP >= 140 MM HG: CPT | Mod: CPTII,S$GLB,, | Performed by: ORTHOPAEDIC SURGERY

## 2018-06-21 PROCEDURE — 73610 X-RAY EXAM OF ANKLE: CPT | Mod: 26,RT,, | Performed by: RADIOLOGY

## 2018-06-21 PROCEDURE — 99999 PR PBB SHADOW E&M-EST. PATIENT-LVL III: CPT | Mod: PBBFAC,,, | Performed by: ORTHOPAEDIC SURGERY

## 2018-06-21 PROCEDURE — 99214 OFFICE O/P EST MOD 30 MIN: CPT | Mod: 25,S$GLB,, | Performed by: ORTHOPAEDIC SURGERY

## 2018-06-21 PROCEDURE — 73630 X-RAY EXAM OF FOOT: CPT | Mod: TC,PO,RT

## 2018-06-21 PROCEDURE — 73630 X-RAY EXAM OF FOOT: CPT | Mod: 26,RT,, | Performed by: RADIOLOGY

## 2018-06-21 PROCEDURE — 20600 DRAIN/INJ JOINT/BURSA W/O US: CPT | Mod: RT,S$GLB,, | Performed by: ORTHOPAEDIC SURGERY

## 2018-06-21 PROCEDURE — 3008F BODY MASS INDEX DOCD: CPT | Mod: CPTII,S$GLB,, | Performed by: ORTHOPAEDIC SURGERY

## 2018-06-21 PROCEDURE — 73610 X-RAY EXAM OF ANKLE: CPT | Mod: TC,PO,RT

## 2018-06-21 PROCEDURE — 3080F DIAST BP >= 90 MM HG: CPT | Mod: CPTII,S$GLB,, | Performed by: ORTHOPAEDIC SURGERY

## 2018-06-21 RX ORDER — NABUMETONE 500 MG/1
500 TABLET, FILM COATED ORAL 2 TIMES DAILY
Qty: 60 TABLET | Refills: 2 | Status: SHIPPED | OUTPATIENT
Start: 2018-06-21 | End: 2018-09-24 | Stop reason: SDUPTHER

## 2018-06-21 RX ORDER — TRIAMCINOLONE ACETONIDE 40 MG/ML
40 INJECTION, SUSPENSION INTRA-ARTICULAR; INTRAMUSCULAR
Status: DISCONTINUED | OUTPATIENT
Start: 2018-06-21 | End: 2018-06-21 | Stop reason: HOSPADM

## 2018-06-21 RX ADMIN — TRIAMCINOLONE ACETONIDE 40 MG: 40 INJECTION, SUSPENSION INTRA-ARTICULAR; INTRAMUSCULAR at 10:06

## 2018-06-21 NOTE — PROCEDURES
Small Joint Aspiration/Injection  Date/Time: 6/21/2018 10:20 AM  Performed by: MICHEL ROSAS  Authorized by: MICHEL ROSAS     Consent Done?:  Yes (Verbal)  Indications:  Pain  Site marked: The procedure site was marked    Timeout: Prior to procedure the correct patient, procedure, and site was verified      Location:  Foot  Site:  R intertarsal  Prep: Patient was prepped and draped in usual sterile fashion    Needle size:  22 G  Approach:  Dorsal  Medications:  40 mg triamcinolone acetonide 40 mg/mL  Patient tolerance:  Patient tolerated the procedure well with no immediate complications

## 2018-06-21 NOTE — TELEPHONE ENCOUNTER
----- Message from Ashley Perales sent at 6/21/2018 11:36 AM CDT -----  Contact: Self  Patient is requesting to have Jennifer call her when she has a min     Please call back [phone]

## 2018-06-21 NOTE — PROGRESS NOTES
Subjective:      Patient ID: Jazmyne Stuart is a 58 y.o. female.    Chief Complaint: Pain of the Right Ankle and Foot Pain (Right)    Pt is here for f/u today c/o severe right foot and ankle pain. She rates her pain as 6/10 today.  She has increased pain in the evenings.  She says the pain is throbbing in nature and worse with activity. Her ankle no longer gives out.   She says the pain is in the top of the foot and radiates up the ankle.   She says the ankle injection only helped a little for a couple of weeks.     Social History     Occupational History    Not on file.     Social History Main Topics    Smoking status: Former Smoker     Packs/day: 0.50     Quit date: 1/10/2017    Smokeless tobacco: Never Used    Alcohol use 0.6 oz/week     1 Cans of beer per week    Drug use: Unknown    Sexual activity: Not on file            Objective:    Ortho Exam     RLE: Neurovascularly intact, incisions well healed, no swelling.  No signs of infection. 1+ anterior drawer test which is comparable with the left ankle. Pes planus.   tenderness to palpation dorsomedial foot. Mild  tenderness to palpation lateral ankle joint. nttp medial ankle joint.       XRAYS: 3 views of right foot and ankle obtained and reviewed today reveal mild  degenerative changes lateral ankle. Moderate osteoarthritis of the midfoot,  the naviculocuneiform joint and mild at the talonavicular joint.     Assessment:       1. Chronic pain of right ankle    2. Arthritis of right foot          Plan:     I injected the right midfoot today. We discussed osteoarthritis of the ankle and foot and symptomatic treatment. I can repeat injections every 4-6 months or prn as needed. I recommend she wear her boot on days when the foot is very painful.   F/u prn.

## 2018-06-22 PROBLEM — R07.9 CHEST PAIN: Status: RESOLVED | Noted: 2017-03-27 | Resolved: 2018-06-22

## 2018-08-08 PROBLEM — Z91.199 NO-SHOW FOR APPOINTMENT: Status: ACTIVE | Noted: 2018-08-08

## 2018-09-24 RX ORDER — NABUMETONE 500 MG/1
TABLET, FILM COATED ORAL
Qty: 60 TABLET | Refills: 0 | Status: SHIPPED | OUTPATIENT
Start: 2018-09-24 | End: 2018-11-07

## 2019-03-08 DIAGNOSIS — G89.29 CHRONIC PAIN OF RIGHT ANKLE: Primary | ICD-10-CM

## 2019-03-08 DIAGNOSIS — M25.571 CHRONIC PAIN OF RIGHT ANKLE: Primary | ICD-10-CM

## 2019-03-18 DIAGNOSIS — M79.671 RIGHT FOOT PAIN: Primary | ICD-10-CM

## 2019-03-21 ENCOUNTER — HOSPITAL ENCOUNTER (OUTPATIENT)
Dept: RADIOLOGY | Facility: HOSPITAL | Age: 59
Discharge: HOME | End: 2019-03-21
Attending: ORTHOPAEDIC SURGERY
Payer: COMMERCIAL

## 2019-03-21 ENCOUNTER — OFFICE VISIT (OUTPATIENT)
Dept: ORTHOPEDICS | Facility: CLINIC | Age: 59
End: 2019-03-21
Payer: COMMERCIAL

## 2019-03-21 VITALS
DIASTOLIC BLOOD PRESSURE: 99 MMHG | HEART RATE: 77 BPM | SYSTOLIC BLOOD PRESSURE: 175 MMHG | WEIGHT: 193.31 LBS | BODY MASS INDEX: 36.5 KG/M2 | HEIGHT: 61 IN

## 2019-03-21 DIAGNOSIS — M19.079 ARTHRITIS OF MIDFOOT: Primary | ICD-10-CM

## 2019-03-21 DIAGNOSIS — G89.29 CHRONIC PAIN OF RIGHT ANKLE: ICD-10-CM

## 2019-03-21 DIAGNOSIS — M79.671 RIGHT FOOT PAIN: ICD-10-CM

## 2019-03-21 DIAGNOSIS — M25.571 CHRONIC PAIN OF RIGHT ANKLE: ICD-10-CM

## 2019-03-21 PROCEDURE — 73630 XR FOOT COMPLETE 3 VIEW RIGHT: ICD-10-PCS | Mod: 26,RT,, | Performed by: RADIOLOGY

## 2019-03-21 PROCEDURE — 20600 SMALL JOINT ASPIRATION/INJECTION: R INTERTARSAL: ICD-10-PCS | Mod: RT,S$GLB,, | Performed by: ORTHOPAEDIC SURGERY

## 2019-03-21 PROCEDURE — 99999 PR PBB SHADOW E&M-EST. PATIENT-LVL III: CPT | Mod: PBBFAC,,, | Performed by: ORTHOPAEDIC SURGERY

## 2019-03-21 PROCEDURE — 99999 PR PBB SHADOW E&M-EST. PATIENT-LVL III: ICD-10-PCS | Mod: PBBFAC,,, | Performed by: ORTHOPAEDIC SURGERY

## 2019-03-21 PROCEDURE — 73610 X-RAY EXAM OF ANKLE: CPT | Mod: TC,PO,RT

## 2019-03-21 PROCEDURE — 3080F DIAST BP >= 90 MM HG: CPT | Mod: CPTII,S$GLB,, | Performed by: ORTHOPAEDIC SURGERY

## 2019-03-21 PROCEDURE — 73610 XR ANKLE COMPLETE 3 VIEW RIGHT: ICD-10-PCS | Mod: 26,RT,, | Performed by: RADIOLOGY

## 2019-03-21 PROCEDURE — 99214 OFFICE O/P EST MOD 30 MIN: CPT | Mod: 25,S$GLB,, | Performed by: ORTHOPAEDIC SURGERY

## 2019-03-21 PROCEDURE — 3008F PR BODY MASS INDEX (BMI) DOCUMENTED: ICD-10-PCS | Mod: CPTII,S$GLB,, | Performed by: ORTHOPAEDIC SURGERY

## 2019-03-21 PROCEDURE — 73610 X-RAY EXAM OF ANKLE: CPT | Mod: 26,RT,, | Performed by: RADIOLOGY

## 2019-03-21 PROCEDURE — 73630 X-RAY EXAM OF FOOT: CPT | Mod: TC,PO,RT

## 2019-03-21 PROCEDURE — 3080F PR MOST RECENT DIASTOLIC BLOOD PRESSURE >= 90 MM HG: ICD-10-PCS | Mod: CPTII,S$GLB,, | Performed by: ORTHOPAEDIC SURGERY

## 2019-03-21 PROCEDURE — 20600 DRAIN/INJ JOINT/BURSA W/O US: CPT | Mod: RT,S$GLB,, | Performed by: ORTHOPAEDIC SURGERY

## 2019-03-21 PROCEDURE — 73630 X-RAY EXAM OF FOOT: CPT | Mod: 26,RT,, | Performed by: RADIOLOGY

## 2019-03-21 PROCEDURE — 3077F PR MOST RECENT SYSTOLIC BLOOD PRESSURE >= 140 MM HG: ICD-10-PCS | Mod: CPTII,S$GLB,, | Performed by: ORTHOPAEDIC SURGERY

## 2019-03-21 PROCEDURE — 99214 PR OFFICE/OUTPT VISIT, EST, LEVL IV, 30-39 MIN: ICD-10-PCS | Mod: 25,S$GLB,, | Performed by: ORTHOPAEDIC SURGERY

## 2019-03-21 PROCEDURE — 3008F BODY MASS INDEX DOCD: CPT | Mod: CPTII,S$GLB,, | Performed by: ORTHOPAEDIC SURGERY

## 2019-03-21 PROCEDURE — 3077F SYST BP >= 140 MM HG: CPT | Mod: CPTII,S$GLB,, | Performed by: ORTHOPAEDIC SURGERY

## 2019-03-21 RX ADMIN — TRIAMCINOLONE ACETONIDE 40 MG: 40 INJECTION, SUSPENSION INTRA-ARTICULAR; INTRAMUSCULAR at 02:03

## 2019-03-21 NOTE — PROCEDURES
Small Joint Aspiration/Injection: R intertarsal  Date/Time: 3/21/2019 2:48 PM  Performed by: Mauro Tong MD  Authorized by: Mauro Tong MD     Consent Done?:  Yes (Verbal)  Indications:  Pain  Site marked: The procedure site was marked    Timeout: Prior to procedure the correct patient, procedure, and site was verified      Location:  Foot  Site:  R intertarsal  Prep: Patient was prepped and draped in usual sterile fashion    Needle size:  22 G  Approach:  Dorsal  Medications:  40 mg triamcinolone acetonide 40 mg/mL  Patient tolerance:  Patient tolerated the procedure well with no immediate complications

## 2019-03-25 ENCOUNTER — TELEPHONE (OUTPATIENT)
Dept: ORTHOPEDICS | Facility: CLINIC | Age: 59
End: 2019-03-25

## 2019-03-25 ENCOUNTER — PATIENT MESSAGE (OUTPATIENT)
Dept: ORTHOPEDICS | Facility: CLINIC | Age: 59
End: 2019-03-25

## 2019-03-25 NOTE — TELEPHONE ENCOUNTER
----- Message from Zhanna Bee sent at 3/25/2019  1:46 PM CDT -----  Type: Needs Medical Advice    Who Called:  Patient    Best Call Back Number: [phone]   Additional Information: Patient is requesting a call back to discuss her pain level after her injection  And patient also  wants to see if she get her handicap sticker renewed. Please call and advise.

## 2019-03-25 NOTE — TELEPHONE ENCOUNTER
Spoke to patient. States she is having pain in foot still after injection. She would like to know if you can renew the handicap tag that she got from you last year?

## 2019-03-26 RX ORDER — TRIAMCINOLONE ACETONIDE 40 MG/ML
40 INJECTION, SUSPENSION INTRA-ARTICULAR; INTRAMUSCULAR
Status: DISCONTINUED | OUTPATIENT
Start: 2019-03-21 | End: 2019-03-26 | Stop reason: HOSPADM

## 2019-03-26 NOTE — PROGRESS NOTES
Subjective:      Patient ID: Jazmyne Stuart is a 58 y.o. female.    Chief Complaint: Pain of the Right Foot and Pain of the Right Ankle    Pt is here for f/u today c/o severe right foot pain. She rates her pain as 6/10 today. Her last injection was on 18 and is lasted about 4 months.     Social History     Occupational History    Not on file   Tobacco Use    Smoking status: Former Smoker     Packs/day: 0.50     Last attempt to quit: 1/10/2017     Years since quittin.2    Smokeless tobacco: Never Used   Substance and Sexual Activity    Alcohol use: Yes     Alcohol/week: 0.6 oz     Types: 1 Cans of beer per week    Drug use: Not on file    Sexual activity: Not on file            Objective:    Ortho Exam     RLE: Neurovascularly intact, incisions well healed, no swelling.   Pes planus.   tenderness to palpation dorsomedial foot.       XRAYS: 3 views of right foot obtained and reviewed today reveal  Moderate osteoarthritis of the midfoot,  the naviculocuneiform joint and mild at the talonavicular joint.     Assessment:       1. Arthritis of midfoot          Plan:     I injected the right midfoot today. We discussed midfoot fusion if symptoms persist.

## 2019-07-09 ENCOUNTER — TELEPHONE (OUTPATIENT)
Dept: ORTHOPEDICS | Facility: CLINIC | Age: 59
End: 2019-07-09

## 2019-07-09 NOTE — TELEPHONE ENCOUNTER
Notified that Dr. Tong was out of town until Monday. Would like to know if she can have another injection in right foot? Last injection on 3/21/19.

## 2019-07-09 NOTE — TELEPHONE ENCOUNTER
----- Message from Hima Mason sent at 7/9/2019  3:15 PM CDT -----  Contact: pt   Patient has questions about her Rt foot Sever pain , [phone]

## 2019-07-15 ENCOUNTER — PATIENT MESSAGE (OUTPATIENT)
Dept: ORTHOPEDICS | Facility: CLINIC | Age: 59
End: 2019-07-15

## 2019-07-15 RX ORDER — IBUPROFEN 800 MG/1
800 TABLET ORAL 3 TIMES DAILY
Qty: 60 TABLET | Refills: 0 | Status: SHIPPED | OUTPATIENT
Start: 2019-07-15 | End: 2020-08-03

## 2019-11-22 PROBLEM — Z87.891 FORMER CIGARETTE SMOKER: Status: ACTIVE | Noted: 2019-11-22

## 2019-11-22 PROBLEM — Z91.199 NO-SHOW FOR APPOINTMENT: Status: RESOLVED | Noted: 2018-08-08 | Resolved: 2019-11-22

## 2019-12-13 PROBLEM — E66.812 OBESITY, CLASS II, BMI 35-39.9: Status: ACTIVE | Noted: 2019-12-13

## 2019-12-13 PROBLEM — E66.9 OBESITY, CLASS II, BMI 35-39.9: Status: ACTIVE | Noted: 2019-12-13

## 2023-02-10 DIAGNOSIS — M25.552 LEFT HIP PAIN: Primary | ICD-10-CM

## 2023-02-14 ENCOUNTER — OFFICE VISIT (OUTPATIENT)
Dept: ORTHOPEDICS | Facility: CLINIC | Age: 63
End: 2023-02-14
Payer: COMMERCIAL

## 2023-02-14 ENCOUNTER — HOSPITAL ENCOUNTER (OUTPATIENT)
Dept: RADIOLOGY | Facility: HOSPITAL | Age: 63
Discharge: HOME | End: 2023-02-14
Attending: ORTHOPAEDIC SURGERY
Payer: COMMERCIAL

## 2023-02-14 VITALS — HEIGHT: 60 IN | WEIGHT: 220 LBS | BODY MASS INDEX: 43.19 KG/M2

## 2023-02-14 DIAGNOSIS — M70.62 GREATER TROCHANTERIC BURSITIS OF LEFT HIP: ICD-10-CM

## 2023-02-14 DIAGNOSIS — M25.562 ACUTE PAIN OF LEFT KNEE: ICD-10-CM

## 2023-02-14 DIAGNOSIS — M25.552 LEFT HIP PAIN: ICD-10-CM

## 2023-02-14 DIAGNOSIS — M76.892 ADDUCTOR TENDINITIS OF LEFT HIP: ICD-10-CM

## 2023-02-14 DIAGNOSIS — M25.562 ACUTE PAIN OF LEFT KNEE: Primary | ICD-10-CM

## 2023-02-14 PROCEDURE — 73560 X-RAY EXAM OF KNEE 1 OR 2: CPT | Mod: TC,PO,RT

## 2023-02-14 PROCEDURE — 99999 PR PBB SHADOW E&M-EST. PATIENT-LVL III: CPT | Mod: PBBFAC,,, | Performed by: ORTHOPAEDIC SURGERY

## 2023-02-14 PROCEDURE — 1160F RVW MEDS BY RX/DR IN RCRD: CPT | Mod: CPTII,S$GLB,, | Performed by: ORTHOPAEDIC SURGERY

## 2023-02-14 PROCEDURE — 3008F BODY MASS INDEX DOCD: CPT | Mod: CPTII,S$GLB,, | Performed by: ORTHOPAEDIC SURGERY

## 2023-02-14 PROCEDURE — 73502 XR ORTHO PELVIS_HIP POST OP LEFT: ICD-10-PCS | Mod: 26,LT,, | Performed by: RADIOLOGY

## 2023-02-14 PROCEDURE — 73562 X-RAY EXAM OF KNEE 3: CPT | Mod: 26,LT,, | Performed by: RADIOLOGY

## 2023-02-14 PROCEDURE — 73502 X-RAY EXAM HIP UNI 2-3 VIEWS: CPT | Mod: TC,PO,LT

## 2023-02-14 PROCEDURE — 99204 PR OFFICE/OUTPT VISIT, NEW, LEVL IV, 45-59 MIN: ICD-10-PCS | Mod: 25,S$GLB,, | Performed by: ORTHOPAEDIC SURGERY

## 2023-02-14 PROCEDURE — 99204 OFFICE O/P NEW MOD 45 MIN: CPT | Mod: 25,S$GLB,, | Performed by: ORTHOPAEDIC SURGERY

## 2023-02-14 PROCEDURE — 1160F PR REVIEW ALL MEDS BY PRESCRIBER/CLIN PHARMACIST DOCUMENTED: ICD-10-PCS | Mod: CPTII,S$GLB,, | Performed by: ORTHOPAEDIC SURGERY

## 2023-02-14 PROCEDURE — 73562 XR KNEE ORTHO LEFT: ICD-10-PCS | Mod: 26,LT,, | Performed by: RADIOLOGY

## 2023-02-14 PROCEDURE — 99999 PR PBB SHADOW E&M-EST. PATIENT-LVL III: ICD-10-PCS | Mod: PBBFAC,,, | Performed by: ORTHOPAEDIC SURGERY

## 2023-02-14 PROCEDURE — 3008F PR BODY MASS INDEX (BMI) DOCUMENTED: ICD-10-PCS | Mod: CPTII,S$GLB,, | Performed by: ORTHOPAEDIC SURGERY

## 2023-02-14 PROCEDURE — 20610 LARGE JOINT ASPIRATION/INJECTION: L GREATER TROCHANTERIC BURSA: ICD-10-PCS | Mod: LT,S$GLB,, | Performed by: ORTHOPAEDIC SURGERY

## 2023-02-14 PROCEDURE — 1159F MED LIST DOCD IN RCRD: CPT | Mod: CPTII,S$GLB,, | Performed by: ORTHOPAEDIC SURGERY

## 2023-02-14 PROCEDURE — 73560 XR KNEE ORTHO LEFT: ICD-10-PCS | Mod: 26,RT,, | Performed by: RADIOLOGY

## 2023-02-14 PROCEDURE — 20610 DRAIN/INJ JOINT/BURSA W/O US: CPT | Mod: LT,S$GLB,, | Performed by: ORTHOPAEDIC SURGERY

## 2023-02-14 PROCEDURE — 1159F PR MEDICATION LIST DOCUMENTED IN MEDICAL RECORD: ICD-10-PCS | Mod: CPTII,S$GLB,, | Performed by: ORTHOPAEDIC SURGERY

## 2023-02-14 PROCEDURE — 73502 X-RAY EXAM HIP UNI 2-3 VIEWS: CPT | Mod: 26,LT,, | Performed by: RADIOLOGY

## 2023-02-14 PROCEDURE — 73560 X-RAY EXAM OF KNEE 1 OR 2: CPT | Mod: 26,RT,, | Performed by: RADIOLOGY

## 2023-02-14 RX ORDER — METHOCARBAMOL 750 MG/1
750 TABLET, FILM COATED ORAL 4 TIMES DAILY PRN
Qty: 44 TABLET | Refills: 3 | Status: SHIPPED | OUTPATIENT
Start: 2023-02-14 | End: 2023-06-05 | Stop reason: SDUPTHER

## 2023-02-14 RX ORDER — TRAMADOL HYDROCHLORIDE AND ACETAMINOPHEN 37.5; 325 MG/1; MG/1
1 TABLET, FILM COATED ORAL DAILY PRN
COMMUNITY

## 2023-02-14 RX ADMIN — TRIAMCINOLONE ACETONIDE 40 MG: 40 INJECTION, SUSPENSION INTRA-ARTICULAR; INTRAMUSCULAR at 10:02

## 2023-02-14 NOTE — PROGRESS NOTES
Chief Complaint   Patient presents with    Left Hip - Pain, Numbness      HPI:   This is a 62 y.o. who presents to clinic today for left hip pain for the past 2 months after no known trauma. Complains of pain while sleeping on side and when descending stairs. Pain is dull. No numbness or tingling. No associated signs or symptoms.      Past Medical History:   Diagnosis Date    Arthritis     right foot    Hip arthritis     Hypertension     No-show for appointment 2018 Urgent Bilateral upper arm bruising    Postmenopausal hormone replacement therapy      Past Surgical History:   Procedure Laterality Date    ANKLE SURGERY Right 10/2017     SECTION      ENDOMETRIAL ABLATION      HIP SURGERY      JOINT REPLACEMENT Left     Total hip replacement    TUBAL LIGATION       Current Outpatient Medications on File Prior to Visit   Medication Sig Dispense Refill    tramadol-acetaminophen 37.5-325 mg (ULTRACET) 37.5-325 mg Tab Take 1 tablet by mouth daily as needed for Pain (can take up to 3 times daily).      buPROPion (WELLBUTRIN XL) 150 MG TB24 tablet Take 1 tablet (150 mg total) by mouth once daily. 90 tablet 3    clonazePAM (KLONOPIN) 0.5 MG tablet TAKE ONE TABLET BY MOUTH AT BEDTIME WHEN NEEDED.  30 tablet 0    irbesartan-hydrochlorothiazide (AVALIDE) 300-12.5 mg per tablet Take 1 tablet by mouth once daily. 90 tablet 3    meloxicam (MOBIC) 15 MG tablet Take 1 tablet (15 mg total) by mouth once daily. 90 tablet 3    pantoprazole (PROTONIX) 40 MG tablet Take 1 tablet (40 mg total) by mouth once daily. 90 tablet 3     Current Facility-Administered Medications on File Prior to Visit   Medication Dose Route Frequency Provider Last Rate Last Admin    acetaminophen tablet 650 mg  650 mg Oral Once PRN Richard M. Jeansonne Jr., MD        albuterol inhaler 2 puff  2 puff Inhalation Q20 Min PRN Richard M. Jeansonne Jr., MD        diphenhydrAMINE injection 25 mg  25 mg Intravenous Once PRN Richard M. Jeansonne  MD Ayad        EPINEPHrine injection 0.3 mg  0.3 mg Intramuscular PRN Richard M. Jeansonne Jr., MD        methylPREDNISolone sodium succinate injection 40 mg  40 mg Intravenous Once PRN Richard M. Jeansonne Jr., MD        ondansetron disintegrating tablet 4 mg  4 mg Oral Once PRN Richard M. Jeansonne Jr., MD        sodium chloride 0.9% 500 mL flush bag   Intravenous PRN Richard M. Jeansonne Jr., MD        sodium chloride 0.9% flush 10 mL  10 mL Intravenous PRN Richard M. Jeansonne Jr., MD         Review of patient's allergies indicates:   Allergen Reactions    Ace inhibitors Other (See Comments)     Cough      Family History   Problem Relation Age of Onset    Anxiety disorder Mother     Heart disease Mother     Hypertension Mother     Anxiety disorder Father     Anesthesia problems Neg Hx     Clotting disorder Neg Hx      Social History     Socioeconomic History    Marital status:    Tobacco Use    Smoking status: Former     Packs/day: 0.50     Types: Cigarettes     Quit date: 1/10/2017     Years since quittin.1    Smokeless tobacco: Never   Substance and Sexual Activity    Alcohol use: Yes     Alcohol/week: 1.0 standard drink     Types: 1 Cans of beer per week     Comment: occ       Review of Systems:  Constitutional:  Denies fever or chills   Eyes:  Denies change in visual acuity   HENT:  Denies nasal congestion or sore throat   Respiratory:  Denies cough or shortness of breath   Cardiovascular:  Denies chest pain or edema   GI:  Denies abdominal pain, nausea, vomiting, bloody stools or diarrhea   :  Denies dysuria   Integument:  Denies rash   Neurologic:  Denies headache, focal weakness or sensory changes   Endocrine:  Denies polyuria or polydipsia   Lymphatic:  Denies swollen glands   Psychiatric:  Denies depression or anxiety     Physical Exam:   Constitutional:  Well developed, well nourished, no acute distress, non-toxic appearance   Integument:  Well hydrated, no rash   Lymphatic:  No  lymphadenopathy noted   Neurologic:  Alert & oriented x 3  Psychiatric:  Speech and behavior appropriate     Bilateral Hip Exam    right Hip Exam   right hip exam performed same as contralateral hip and is normal.     left Hip Exam   Tenderness   The patient is experiencing tenderness in the greater trochanter and adductor.  Range of Motion   The patient has normal hip ROM.  Muscle Strength   Abduction: 4/5   Other   Erythema: absent  Sensation: normal  Pulse: present    X-rays were personally reviewed by me and findings discussed with the patient.  2 views of the left hip show no fractures or dislocations    Acute pain of left knee  -     X-ray Knee Ortho Left; Future; Expected date: 02/14/2023    Greater trochanteric bursitis of left hip    Adductor tendinitis of left hip    Other orders  -     methocarbamoL (ROBAXIN) 750 MG Tab; Take 1 tablet (750 mg total) by mouth 4 (four) times daily as needed.  Dispense: 44 tablet; Refill: 3           Using an aseptic technique, I injected 5 cc of lidocaine 1% without and 1 cc of kenalog 40mg into the left Hip and 1:1 in the left adductor. The patient tolerated this well. I will have them return to clinic in 6 weeks.

## 2023-02-16 RX ORDER — TRIAMCINOLONE ACETONIDE 40 MG/ML
40 INJECTION, SUSPENSION INTRA-ARTICULAR; INTRAMUSCULAR
Status: DISCONTINUED | OUTPATIENT
Start: 2023-02-14 | End: 2023-02-16 | Stop reason: HOSPADM

## 2023-02-16 NOTE — PROCEDURES
Large Joint Aspiration/Injection: L greater trochanteric bursa    Date/Time: 2/14/2023 10:45 AM  Performed by: Carter Elliott MD  Authorized by: Carter Elliott MD     Consent Done?:  Yes (Verbal)  Indications:  Pain  Timeout: prior to procedure the correct patient, procedure, and site was verified    Prep: patient was prepped and draped in usual sterile fashion    Local anesthetic:  Lidocaine 1% without epinephrine  Anesthetic total (ml):  5      Details:  Needle Size:  21 G  Approach:  Lateral  Location:  Hip  Site:  L greater trochanteric bursa  Medications:  40 mg triamcinolone acetonide 40 mg/mL  Patient tolerance:  Patient tolerated the procedure well with no immediate complications  Tendon Sheath    Date/Time: 2/14/2023 10:45 AM  Performed by: Carter Elliott MD  Authorized by: Carter Elliott MD     Consent Done?:  Yes (Verbal)  Indications:  Pain  Site marked: the procedure site was marked    Timeout: prior to procedure the correct patient, procedure, and site was verified    Prep: patient was prepped and draped in usual sterile fashion      Needle size:  25 G  Medications:  40 mg triamcinolone acetonide 40 mg/mL  Patient tolerance:  Patient tolerated the procedure well with no immediate complications

## 2023-05-23 ENCOUNTER — OFFICE VISIT (OUTPATIENT)
Dept: ORTHOPEDICS | Facility: CLINIC | Age: 63
End: 2023-05-23
Payer: COMMERCIAL

## 2023-05-23 VITALS — HEIGHT: 60 IN | WEIGHT: 220 LBS | BODY MASS INDEX: 43.19 KG/M2

## 2023-05-23 DIAGNOSIS — M76.892 HIP FLEXOR TENDINITIS, LEFT: ICD-10-CM

## 2023-05-23 DIAGNOSIS — M70.62 GREATER TROCHANTERIC BURSITIS OF LEFT HIP: Primary | ICD-10-CM

## 2023-05-23 DIAGNOSIS — M76.892 ADDUCTOR TENDINITIS OF LEFT HIP: ICD-10-CM

## 2023-05-23 PROCEDURE — 1160F PR REVIEW ALL MEDS BY PRESCRIBER/CLIN PHARMACIST DOCUMENTED: ICD-10-PCS | Mod: CPTII,S$GLB,, | Performed by: ORTHOPAEDIC SURGERY

## 2023-05-23 PROCEDURE — 3008F PR BODY MASS INDEX (BMI) DOCUMENTED: ICD-10-PCS | Mod: CPTII,S$GLB,, | Performed by: ORTHOPAEDIC SURGERY

## 2023-05-23 PROCEDURE — 99999 PR PBB SHADOW E&M-EST. PATIENT-LVL III: CPT | Mod: PBBFAC,,, | Performed by: ORTHOPAEDIC SURGERY

## 2023-05-23 PROCEDURE — 99999 PR PBB SHADOW E&M-EST. PATIENT-LVL III: ICD-10-PCS | Mod: PBBFAC,,, | Performed by: ORTHOPAEDIC SURGERY

## 2023-05-23 PROCEDURE — 1159F MED LIST DOCD IN RCRD: CPT | Mod: CPTII,S$GLB,, | Performed by: ORTHOPAEDIC SURGERY

## 2023-05-23 PROCEDURE — 20610 DRAIN/INJ JOINT/BURSA W/O US: CPT | Mod: LT,S$GLB,, | Performed by: ORTHOPAEDIC SURGERY

## 2023-05-23 PROCEDURE — 99214 PR OFFICE/OUTPT VISIT, EST, LEVL IV, 30-39 MIN: ICD-10-PCS | Mod: 25,S$GLB,, | Performed by: ORTHOPAEDIC SURGERY

## 2023-05-23 PROCEDURE — 20610 LARGE JOINT ASPIRATION/INJECTION: L GREATER TROCHANTERIC BURSA: ICD-10-PCS | Mod: LT,S$GLB,, | Performed by: ORTHOPAEDIC SURGERY

## 2023-05-23 PROCEDURE — 1159F PR MEDICATION LIST DOCUMENTED IN MEDICAL RECORD: ICD-10-PCS | Mod: CPTII,S$GLB,, | Performed by: ORTHOPAEDIC SURGERY

## 2023-05-23 PROCEDURE — 99214 OFFICE O/P EST MOD 30 MIN: CPT | Mod: 25,S$GLB,, | Performed by: ORTHOPAEDIC SURGERY

## 2023-05-23 PROCEDURE — 1160F RVW MEDS BY RX/DR IN RCRD: CPT | Mod: CPTII,S$GLB,, | Performed by: ORTHOPAEDIC SURGERY

## 2023-05-23 PROCEDURE — 3008F BODY MASS INDEX DOCD: CPT | Mod: CPTII,S$GLB,, | Performed by: ORTHOPAEDIC SURGERY

## 2023-05-23 RX ADMIN — TRIAMCINOLONE ACETONIDE 40 MG: 40 INJECTION, SUSPENSION INTRA-ARTICULAR; INTRAMUSCULAR at 10:05

## 2023-06-01 RX ORDER — TRIAMCINOLONE ACETONIDE 40 MG/ML
40 INJECTION, SUSPENSION INTRA-ARTICULAR; INTRAMUSCULAR
Status: DISCONTINUED | OUTPATIENT
Start: 2023-05-23 | End: 2023-06-01 | Stop reason: HOSPADM

## 2023-06-01 NOTE — PROGRESS NOTES
Chief Complaint   Patient presents with    Left Hip - Pain      HPI:   This is a 63 y.o. who presents to clinic today for left hip pain for the past 2 weeks after no known trauma. Complains of pain while sleeping on side and when descending stairs. Pain is dull. No numbness or tingling. No associated signs or symptoms.      Past Medical History:   Diagnosis Date    Arthritis     right foot    Hip arthritis     Hypertension     No-show for appointment 2018 Urgent Bilateral upper arm bruising    Postmenopausal hormone replacement therapy      Past Surgical History:   Procedure Laterality Date    ANKLE SURGERY Right 10/2017     SECTION      ENDOMETRIAL ABLATION      HIP SURGERY      JOINT REPLACEMENT Left     Total hip replacement    TUBAL LIGATION       Current Outpatient Medications on File Prior to Visit   Medication Sig Dispense Refill    buPROPion (WELLBUTRIN XL) 150 MG TB24 tablet Take 1 tablet (150 mg total) by mouth once daily. 90 tablet 3    clonazePAM (KLONOPIN) 0.5 MG tablet TAKE ONE TABLET BY MOUTH AT BEDTIME WHEN NEEDED.  30 tablet 0    irbesartan-hydrochlorothiazide (AVALIDE) 300-12.5 mg per tablet Take 1 tablet by mouth once daily. 90 tablet 3    meloxicam (MOBIC) 15 MG tablet Take 1 tablet (15 mg total) by mouth once daily. 90 tablet 3    methocarbamoL (ROBAXIN) 750 MG Tab Take 1 tablet (750 mg total) by mouth 4 (four) times daily as needed. 44 tablet 3    pantoprazole (PROTONIX) 40 MG tablet Take 1 tablet (40 mg total) by mouth once daily. 90 tablet 3    tramadol-acetaminophen 37.5-325 mg (ULTRACET) 37.5-325 mg Tab Take 1 tablet by mouth daily as needed for Pain (can take up to 3 times daily).       Current Facility-Administered Medications on File Prior to Visit   Medication Dose Route Frequency Provider Last Rate Last Admin    acetaminophen tablet 650 mg  650 mg Oral Once PRN Richard M. Jeansonne Jr., MD        albuterol inhaler 2 puff  2 puff Inhalation Q20 Min PRN Estrada MONTES  Jeansonne Jr., MD        diphenhydrAMINE injection 25 mg  25 mg Intravenous Once PRN Richard M. Jeansonne Jr., MD        EPINEPHrine injection 0.3 mg  0.3 mg Intramuscular PRN Richard M. Jeansonne Jr., MD        methylPREDNISolone sodium succinate injection 40 mg  40 mg Intravenous Once PRN Richard M. Jeansonne Jr., MD        ondansetron disintegrating tablet 4 mg  4 mg Oral Once PRN Richard M. Jeansonne Jr., MD        sodium chloride 0.9% 500 mL flush bag   Intravenous PRN Richard M. Jeansonne Jr., MD        sodium chloride 0.9% flush 10 mL  10 mL Intravenous PRN Richard M. Jeansonne Jr., MD         Review of patient's allergies indicates:   Allergen Reactions    Ace inhibitors Other (See Comments)     Cough      Family History   Problem Relation Age of Onset    Anxiety disorder Mother     Heart disease Mother     Hypertension Mother     Anxiety disorder Father     Anesthesia problems Neg Hx     Clotting disorder Neg Hx      Social History     Socioeconomic History    Marital status:    Tobacco Use    Smoking status: Former     Packs/day: 0.50     Types: Cigarettes     Quit date: 1/10/2017     Years since quittin.3    Smokeless tobacco: Never   Substance and Sexual Activity    Alcohol use: Yes     Alcohol/week: 1.0 standard drink     Types: 1 Cans of beer per week     Comment: occ       Review of Systems:  Constitutional:  Denies fever or chills   Eyes:  Denies change in visual acuity   HENT:  Denies nasal congestion or sore throat   Respiratory:  Denies cough or shortness of breath   Cardiovascular:  Denies chest pain or edema   GI:  Denies abdominal pain, nausea, vomiting, bloody stools or diarrhea   :  Denies dysuria   Integument:  Denies rash   Neurologic:  Denies headache, focal weakness or sensory changes   Endocrine:  Denies polyuria or polydipsia   Lymphatic:  Denies swollen glands   Psychiatric:  Denies depression or anxiety     Physical Exam:   Constitutional:  Well developed, well nourished,  no acute distress, non-toxic appearance   Integument:  Well hydrated, no rash   Lymphatic:  No lymphadenopathy noted   Neurologic:  Alert & oriented x 3  Psychiatric:  Speech and behavior appropriate     Bilateral Hip Exam    right Hip Exam   right hip exam performed same as contralateral hip and is normal.     left Hip Exam   Tenderness   The patient is experiencing tenderness in the greater trochanter, hip flexor and adductor.  Range of Motion   The patient has normal hip ROM.  Muscle Strength   Abduction: 4/5   Other   Erythema: absent  Sensation: normal  Pulse: present    X-rays were personally reviewed by me and findings discussed with the patient.  2 views of the left hip show no fractures or dislocations    Greater trochanteric bursitis of left hip    Adductor tendinitis of left hip    Hip flexor tendinitis, left           Using an aseptic technique, I injected 5 cc of lidocaine 1% without and 1 cc of kenalog 40mg into the left Hip and 1:1 in the left adductor and hip flexor. The patient tolerated this well. I will have them return to clinic in 6 weeks.

## 2023-06-01 NOTE — PROCEDURES
Large Joint Aspiration/Injection: L greater trochanteric bursa    Date/Time: 5/23/2023 10:15 AM  Performed by: Carter Elliott MD  Authorized by: Carter Elliott MD     Consent Done?:  Yes (Verbal)  Indications:  Pain  Timeout: prior to procedure the correct patient, procedure, and site was verified    Prep: patient was prepped and draped in usual sterile fashion    Local anesthetic:  Lidocaine 1% without epinephrine  Anesthetic total (ml):  5      Details:  Needle Size:  21 G  Approach:  Lateral  Location:  Hip  Site:  L greater trochanteric bursa  Medications:  40 mg triamcinolone acetonide 40 mg/mL  Patient tolerance:  Patient tolerated the procedure well with no immediate complications  Tendon Sheath    Date/Time: 5/23/2023 10:15 AM  Performed by: Carter Elliott MD  Authorized by: Carter Elliott MD     Consent Done?:  Yes (Verbal)  Indications:  Pain  Site marked: the procedure site was marked    Timeout: prior to procedure the correct patient, procedure, and site was verified    Prep: patient was prepped and draped in usual sterile fashion      Needle size:  25 G  Medications:  40 mg triamcinolone acetonide 40 mg/mL  Patient tolerance:  Patient tolerated the procedure well with no immediate complications  Tendon Sheath    Date/Time: 5/23/2023 10:15 AM  Performed by: Carter Elliott MD  Authorized by: Carter Elliott MD     Consent Done?:  Yes (Verbal)  Indications:  Pain  Site marked: the procedure site was marked    Timeout: prior to procedure the correct patient, procedure, and site was verified    Prep: patient was prepped and draped in usual sterile fashion      Needle size:  25 G  Medications:  40 mg triamcinolone acetonide 40 mg/mL  Patient tolerance:  Patient tolerated the procedure well with no immediate complications

## 2023-06-05 RX ORDER — METHOCARBAMOL 750 MG/1
750 TABLET, FILM COATED ORAL 4 TIMES DAILY PRN
Qty: 44 TABLET | Refills: 3 | Status: SHIPPED | OUTPATIENT
Start: 2023-06-05

## 2023-07-13 ENCOUNTER — OFFICE VISIT (OUTPATIENT)
Dept: ORTHOPEDICS | Facility: CLINIC | Age: 63
End: 2023-07-13
Payer: COMMERCIAL

## 2023-07-13 DIAGNOSIS — M70.61 GREATER TROCHANTERIC BURSITIS OF RIGHT HIP: ICD-10-CM

## 2023-07-13 DIAGNOSIS — M70.62 GREATER TROCHANTERIC BURSITIS OF LEFT HIP: Primary | ICD-10-CM

## 2023-07-13 PROCEDURE — 99212 OFFICE O/P EST SF 10 MIN: CPT | Mod: PBBFAC,25,PN | Performed by: ORTHOPAEDIC SURGERY

## 2023-07-13 PROCEDURE — 20610 LARGE JOINT ASPIRATION/INJECTION: BILATERAL GREATER TROCHANTERIC BURSA: ICD-10-PCS | Mod: 50,S$GLB,, | Performed by: ORTHOPAEDIC SURGERY

## 2023-07-13 PROCEDURE — 20610 DRAIN/INJ JOINT/BURSA W/O US: CPT | Mod: 50,PBBFAC,PN | Performed by: ORTHOPAEDIC SURGERY

## 2023-07-13 PROCEDURE — 99999 PR PBB SHADOW E&M-EST. PATIENT-LVL II: CPT | Mod: PBBFAC,,, | Performed by: ORTHOPAEDIC SURGERY

## 2023-07-13 PROCEDURE — 99999 PR PBB SHADOW E&M-EST. PATIENT-LVL II: ICD-10-PCS | Mod: PBBFAC,,, | Performed by: ORTHOPAEDIC SURGERY

## 2023-07-13 PROCEDURE — 99214 OFFICE O/P EST MOD 30 MIN: CPT | Mod: 25,S$GLB,, | Performed by: ORTHOPAEDIC SURGERY

## 2023-07-13 PROCEDURE — 99214 PR OFFICE/OUTPT VISIT, EST, LEVL IV, 30-39 MIN: ICD-10-PCS | Mod: 25,S$GLB,, | Performed by: ORTHOPAEDIC SURGERY

## 2023-07-13 RX ORDER — TRIAMCINOLONE ACETONIDE 40 MG/ML
40 INJECTION, SUSPENSION INTRA-ARTICULAR; INTRAMUSCULAR
Status: DISCONTINUED | OUTPATIENT
Start: 2023-07-13 | End: 2023-07-13 | Stop reason: HOSPADM

## 2023-07-13 RX ADMIN — TRIAMCINOLONE ACETONIDE 40 MG: 40 INJECTION, SUSPENSION INTRA-ARTICULAR; INTRAMUSCULAR at 01:07

## 2023-07-13 NOTE — PROGRESS NOTES
Chief Complaint   Patient presents with    Left Hip - Pain      HPI:   This is a 63 y.o. who presents to clinic today for bilateral hip pain for the past 2 weeks after no known trauma. Complains of pain while sleeping on side and when descending stairs. Pain is dull. No numbness or tingling. No associated signs or symptoms.      Past Medical History:   Diagnosis Date    Arthritis     right foot    Hip arthritis     Hypertension     No-show for appointment 2018 Urgent Bilateral upper arm bruising    Postmenopausal hormone replacement therapy      Past Surgical History:   Procedure Laterality Date    ANKLE SURGERY Right 10/2017     SECTION      ENDOMETRIAL ABLATION      HIP SURGERY      JOINT REPLACEMENT Left     Total hip replacement    TUBAL LIGATION       Current Outpatient Medications on File Prior to Visit   Medication Sig Dispense Refill    buPROPion (WELLBUTRIN XL) 150 MG TB24 tablet Take 1 tablet (150 mg total) by mouth once daily. 90 tablet 3    clonazePAM (KLONOPIN) 0.5 MG tablet TAKE ONE TABLET BY MOUTH AT BEDTIME WHEN NEEDED.  30 tablet 0    irbesartan-hydrochlorothiazide (AVALIDE) 300-12.5 mg per tablet Take 1 tablet by mouth once daily. 90 tablet 3    meloxicam (MOBIC) 15 MG tablet Take 1 tablet (15 mg total) by mouth once daily. 90 tablet 3    methocarbamoL (ROBAXIN) 750 MG Tab Take 1 tablet (750 mg total) by mouth 4 (four) times daily as needed. 44 tablet 3    pantoprazole (PROTONIX) 40 MG tablet Take 1 tablet (40 mg total) by mouth once daily. 90 tablet 3    tramadol-acetaminophen 37.5-325 mg (ULTRACET) 37.5-325 mg Tab Take 1 tablet by mouth daily as needed for Pain (can take up to 3 times daily).       Current Facility-Administered Medications on File Prior to Visit   Medication Dose Route Frequency Provider Last Rate Last Admin    acetaminophen tablet 650 mg  650 mg Oral Once PRN Richard M. Jeansonne Jr., MD        albuterol inhaler 2 puff  2 puff Inhalation Q20 Min PRN  Richard M. Jeansonne Jr., MD        diphenhydrAMINE injection 25 mg  25 mg Intravenous Once PRN Richard M. Jeansonne Jr., MD        EPINEPHrine injection 0.3 mg  0.3 mg Intramuscular PRN Richard M. Jeansonne Jr., MD        methylPREDNISolone sodium succinate injection 40 mg  40 mg Intravenous Once PRN Richard M. Jeansonne Jr., MD        ondansetron disintegrating tablet 4 mg  4 mg Oral Once PRN Richard M. Jeansonne Jr., MD        sodium chloride 0.9% 500 mL flush bag   Intravenous PRN Richard M. Jeansonne Jr., MD        sodium chloride 0.9% flush 10 mL  10 mL Intravenous PRN Richard M. Jeansonne Jr., MD         Review of patient's allergies indicates:   Allergen Reactions    Ace inhibitors Other (See Comments)     Cough      Family History   Problem Relation Age of Onset    Anxiety disorder Mother     Heart disease Mother     Hypertension Mother     Anxiety disorder Father     Anesthesia problems Neg Hx     Clotting disorder Neg Hx      Social History     Socioeconomic History    Marital status:    Tobacco Use    Smoking status: Former     Packs/day: 0.50     Types: Cigarettes     Quit date: 1/10/2017     Years since quittin.5    Smokeless tobacco: Never   Substance and Sexual Activity    Alcohol use: Yes     Alcohol/week: 1.0 standard drink     Types: 1 Cans of beer per week     Comment: occ       Review of Systems:  Constitutional:  Denies fever or chills   Eyes:  Denies change in visual acuity   HENT:  Denies nasal congestion or sore throat   Respiratory:  Denies cough or shortness of breath   Cardiovascular:  Denies chest pain or edema   GI:  Denies abdominal pain, nausea, vomiting, bloody stools or diarrhea   :  Denies dysuria   Integument:  Denies rash   Neurologic:  Denies headache, focal weakness or sensory changes   Endocrine:  Denies polyuria or polydipsia   Lymphatic:  Denies swollen glands   Psychiatric:  Denies depression or anxiety     Physical Exam:   Constitutional:  Well developed, well  nourished, no acute distress, non-toxic appearance   Integument:  Well hydrated, no rash   Lymphatic:  No lymphadenopathy noted   Neurologic:  Alert & oriented x 3  Psychiatric:  Speech and behavior appropriate     Bilateral Hip Exam  Tenderness   The patient is experiencing tenderness in the greater trochanter.  Range of Motion   The patient has normal hip ROM.  Muscle Strength   Abduction: 4/5   Other   Erythema: absent  Sensation: normal  Pulse: present    Greater trochanteric bursitis of left hip    Greater trochanteric bursitis of right hip           Using an aseptic technique, I injected 5 cc of lidocaine 1% without and 1 cc of kenalog 40mg into the bilateral Hip. The patient tolerated this well. I will have them return to clinic in 1 month.

## 2023-07-13 NOTE — PROCEDURES
Large Joint Aspiration/Injection: bilateral greater trochanteric bursa    Date/Time: 7/13/2023 1:15 PM  Performed by: Carter Elliott MD  Authorized by: Carter Elliott MD     Consent Done?:  Yes (Verbal)  Indications:  Pain  Timeout: prior to procedure the correct patient, procedure, and site was verified    Prep: patient was prepped and draped in usual sterile fashion      Local anesthesia used?: Yes    Local anesthetic:  Lidocaine 1% without epinephrine  Anesthetic total (ml):  5      Details:  Needle Size:  21 G  Approach:  Lateral  Location:  Hip  Laterality:  Bilateral  Site:  Bilateral greater trochanteric bursa  Medications (Right):  40 mg triamcinolone acetonide 40 mg/mL  Medications (Left):  40 mg triamcinolone acetonide 40 mg/mL  Patient tolerance:  Patient tolerated the procedure well with no immediate complications

## 2024-02-01 ENCOUNTER — PATIENT MESSAGE (OUTPATIENT)
Dept: ORTHOPEDICS | Facility: CLINIC | Age: 64
End: 2024-02-01
Payer: COMMERCIAL

## 2024-07-16 ENCOUNTER — TELEPHONE (OUTPATIENT)
Dept: PODIATRY | Facility: CLINIC | Age: 64
End: 2024-07-16
Payer: COMMERCIAL

## 2024-07-16 ENCOUNTER — OFFICE VISIT (OUTPATIENT)
Dept: ORTHOPEDICS | Facility: CLINIC | Age: 64
End: 2024-07-16
Payer: COMMERCIAL

## 2024-07-16 ENCOUNTER — HOSPITAL ENCOUNTER (OUTPATIENT)
Dept: RADIOLOGY | Facility: HOSPITAL | Age: 64
Discharge: HOME | End: 2024-07-16
Attending: ORTHOPAEDIC SURGERY
Payer: COMMERCIAL

## 2024-07-16 VITALS — WEIGHT: 220 LBS | BODY MASS INDEX: 43.19 KG/M2 | HEIGHT: 60 IN

## 2024-07-16 DIAGNOSIS — M70.62 GREATER TROCHANTERIC BURSITIS OF LEFT HIP: Primary | ICD-10-CM

## 2024-07-16 DIAGNOSIS — M25.552 BILATERAL HIP PAIN: ICD-10-CM

## 2024-07-16 DIAGNOSIS — M25.551 BILATERAL HIP PAIN: Primary | ICD-10-CM

## 2024-07-16 DIAGNOSIS — M25.552 BILATERAL HIP PAIN: Primary | ICD-10-CM

## 2024-07-16 DIAGNOSIS — M70.61 GREATER TROCHANTERIC BURSITIS OF RIGHT HIP: ICD-10-CM

## 2024-07-16 DIAGNOSIS — M76.892 ADDUCTOR TENDINITIS OF LEFT HIP: ICD-10-CM

## 2024-07-16 DIAGNOSIS — M25.551 BILATERAL HIP PAIN: ICD-10-CM

## 2024-07-16 PROCEDURE — 1160F RVW MEDS BY RX/DR IN RCRD: CPT | Mod: CPTII,S$GLB,, | Performed by: ORTHOPAEDIC SURGERY

## 2024-07-16 PROCEDURE — 3008F BODY MASS INDEX DOCD: CPT | Mod: CPTII,S$GLB,, | Performed by: ORTHOPAEDIC SURGERY

## 2024-07-16 PROCEDURE — 1159F MED LIST DOCD IN RCRD: CPT | Mod: CPTII,S$GLB,, | Performed by: ORTHOPAEDIC SURGERY

## 2024-07-16 PROCEDURE — 99999 PR PBB SHADOW E&M-EST. PATIENT-LVL III: CPT | Mod: PBBFAC,,, | Performed by: ORTHOPAEDIC SURGERY

## 2024-07-16 PROCEDURE — 73521 X-RAY EXAM HIPS BI 2 VIEWS: CPT | Mod: TC,PO

## 2024-07-16 PROCEDURE — 99213 OFFICE O/P EST LOW 20 MIN: CPT | Mod: 25,S$GLB,, | Performed by: ORTHOPAEDIC SURGERY

## 2024-07-16 PROCEDURE — 73521 X-RAY EXAM HIPS BI 2 VIEWS: CPT | Mod: 26,,, | Performed by: RADIOLOGY

## 2024-07-16 PROCEDURE — 20610 DRAIN/INJ JOINT/BURSA W/O US: CPT | Mod: 50,S$GLB,, | Performed by: ORTHOPAEDIC SURGERY

## 2024-07-16 RX ORDER — ALPRAZOLAM 0.5 MG/1
0.5 TABLET ORAL 3 TIMES DAILY PRN
Qty: 30 TABLET | Refills: 0 | Status: SHIPPED | OUTPATIENT
Start: 2024-07-16 | End: 2024-08-15

## 2024-07-16 RX ORDER — BUPROPION HYDROCHLORIDE 300 MG/1
300 TABLET ORAL DAILY
COMMUNITY
Start: 2024-04-10

## 2024-07-16 RX ORDER — METHOCARBAMOL 750 MG/1
750 TABLET, FILM COATED ORAL 4 TIMES DAILY PRN
Qty: 44 TABLET | Refills: 3 | Status: SHIPPED | OUTPATIENT
Start: 2024-07-16

## 2024-07-16 RX ADMIN — TRIAMCINOLONE ACETONIDE 40 MG: 40 INJECTION, SUSPENSION INTRA-ARTICULAR; INTRAMUSCULAR at 03:07

## 2024-07-16 NOTE — TELEPHONE ENCOUNTER
Called patient for information about a scheduled visit. Phone went to [facility] and a message was left for a call back.

## 2024-07-17 ENCOUNTER — TELEPHONE (OUTPATIENT)
Dept: PODIATRY | Facility: CLINIC | Age: 64
End: 2024-07-17
Payer: COMMERCIAL

## 2024-07-17 NOTE — TELEPHONE ENCOUNTER
Called patient to get information. Phone went to voicemail and a message was left for a call back.Called patient today and no answer left a message

## 2024-07-18 RX ORDER — TRIAMCINOLONE ACETONIDE 40 MG/ML
40 INJECTION, SUSPENSION INTRA-ARTICULAR; INTRAMUSCULAR
Status: DISCONTINUED | OUTPATIENT
Start: 2024-07-16 | End: 2024-07-18 | Stop reason: HOSPADM

## 2024-07-18 NOTE — PROCEDURES
Large Joint Aspiration/Injection: bilateral greater trochanteric bursa    Date/Time: 7/16/2024 3:30 PM    Performed by: Carter Elliott MD  Authorized by: Carter Elliott MD    Consent Done?:  Yes (Verbal)  Indications:  Pain  Timeout: prior to procedure the correct patient, procedure, and site was verified    Prep: patient was prepped and draped in usual sterile fashion      Local anesthesia used?: Yes    Local anesthetic:  Lidocaine 1% without epinephrine  Anesthetic total (ml):  5      Details:  Needle Size:  21 G  Approach:  Lateral  Location:  Hip  Laterality:  Bilateral  Site:  Bilateral greater trochanteric bursa  Medications (Right):  40 mg triamcinolone acetonide 40 mg/mL  Medications (Left):  40 mg triamcinolone acetonide 40 mg/mL  Patient tolerance:  Patient tolerated the procedure well with no immediate complications  Tendon Sheath    Date/Time: 7/16/2024 3:30 PM    Performed by: Carter Elliott MD  Authorized by: Carter Elliott MD    Consent Done?:  Yes (Verbal)  Indications:  Pain  Site marked: the procedure site was marked    Timeout: prior to procedure the correct patient, procedure, and site was verified    Prep: patient was prepped and draped in usual sterile fashion      Needle size:  25 G  Medications:  40 mg triamcinolone acetonide 40 mg/mL  Patient tolerance:  Patient tolerated the procedure well with no immediate complications

## 2024-07-18 NOTE — PROGRESS NOTES
Chief Complaint   Patient presents with    Right Hip - Pain, Hip Pain     Bilateral hip pain, surgery 2012 replacement, can't lay on sides, pain radiating down leg, using (4) 200 mg daily tylenol/ibuprophen      HPI:   This is a 64 y.o. who presents to clinic today for bilateral hip pain for the past 2 months after no known trauma. Complains of pain while sleeping on side and when descending stairs. Pain is dull. No numbness or tingling. No associated signs or symptoms.      Past Medical History:   Diagnosis Date    Arthritis     right foot    Hip arthritis     Hypertension     No-show for appointment 2018 Urgent Bilateral upper arm bruising    Postmenopausal hormone replacement therapy      Past Surgical History:   Procedure Laterality Date    ANKLE SURGERY Right 10/2017     SECTION      ENDOMETRIAL ABLATION      HIP SURGERY      JOINT REPLACEMENT Left     Total hip replacement    TUBAL LIGATION       Current Outpatient Medications on File Prior to Visit   Medication Sig Dispense Refill    buPROPion (WELLBUTRIN XL) 300 MG 24 hr tablet Take 300 mg by mouth once daily.      clonazePAM (KLONOPIN) 0.5 MG tablet TAKE ONE TABLET BY MOUTH AT BEDTIME WHEN NEEDED.  30 tablet 0    irbesartan-hydrochlorothiazide (AVALIDE) 300-12.5 mg per tablet Take 1 tablet by mouth once daily. 90 tablet 3    meloxicam (MOBIC) 15 MG tablet Take 1 tablet (15 mg total) by mouth once daily. 90 tablet 3    pantoprazole (PROTONIX) 40 MG tablet Take 1 tablet (40 mg total) by mouth once daily. 90 tablet 3    tramadol-acetaminophen 37.5-325 mg (ULTRACET) 37.5-325 mg Tab Take 1 tablet by mouth daily as needed for Pain (can take up to 3 times daily).       Current Facility-Administered Medications on File Prior to Visit   Medication Dose Route Frequency Provider Last Rate Last Admin    acetaminophen tablet 650 mg  650 mg Oral Once PRN Jeansonne, Richard M. Jr., MD        albuterol inhaler 2 puff  2 puff Inhalation Q20 Min PRN  Jeansonne, Richard M. Jr., MD        diphenhydrAMINE injection 25 mg  25 mg Intravenous Once PRN Jeansonne, Richard M. Jr., MD        EPINEPHrine injection 0.3 mg  0.3 mg Intramuscular PRN Jeansonne, Richard M. Jr., MD        methylPREDNISolone sodium succinate injection 40 mg  40 mg Intravenous Once PRN Jeansonne, Richard M. Jr., MD        ondansetron disintegrating tablet 4 mg  4 mg Oral Once PRN Jeansonne, Richard M. Jr., MD        sodium chloride 0.9% 500 mL flush bag   Intravenous PRN Jeansonne, Richard M. Jr., MD        sodium chloride 0.9% flush 10 mL  10 mL Intravenous PRN Jeansonne, Richard M. Jr., MD         Review of patient's allergies indicates:   Allergen Reactions    Ace inhibitors Other (See Comments)     Cough      Family History   Problem Relation Name Age of Onset    Anxiety disorder Mother      Heart disease Mother      Hypertension Mother      Anxiety disorder Father      Anesthesia problems Neg Hx      Clotting disorder Neg Hx       Social History     Socioeconomic History    Marital status:    Tobacco Use    Smoking status: Former     Current packs/day: 0.00     Types: Cigarettes     Quit date: 1/10/2017     Years since quittin.5    Smokeless tobacco: Never   Substance and Sexual Activity    Alcohol use: Yes     Alcohol/week: 1.0 standard drink of alcohol     Types: 1 Cans of beer per week     Comment: occ     Social Determinants of Health     Financial Resource Strain: Low Risk  (7/15/2024)    Overall Financial Resource Strain (CARDIA)     Difficulty of Paying Living Expenses: Not hard at all   Food Insecurity: No Food Insecurity (7/15/2024)    Hunger Vital Sign     Worried About Running Out of Food in the Last Year: Never true     Ran Out of Food in the Last Year: Never true   Transportation Needs: No Transportation Needs (8/3/2020)    PRAPARE - Transportation     Lack of Transportation (Medical): No     Lack of Transportation (Non-Medical): No   Physical Activity: Inactive  (7/15/2024)    Exercise Vital Sign     Days of Exercise per Week: 0 days     Minutes of Exercise per Session: 20 min   Stress: Stress Concern Present (7/15/2024)    [employer] - Occupational Stress Questionnaire     Feeling of Stress : To some extent   Housing Stability: Unknown (7/15/2024)    Housing Stability Vital Sign     Unable to Pay for Housing in the Last Year: No       Review of Systems:  Constitutional:  Denies fever or chills   Eyes:  Denies change in visual acuity   HENT:  Denies nasal congestion or sore throat   Respiratory:  Denies cough or shortness of breath   Cardiovascular:  Denies chest pain or edema   GI:  Denies abdominal pain, nausea, vomiting, bloody stools or diarrhea   :  Denies dysuria   Integument:  Denies rash   Neurologic:  Denies headache, focal weakness or sensory changes   Endocrine:  Denies polyuria or polydipsia   Lymphatic:  Denies swollen glands   Psychiatric:  Denies depression or anxiety     Physical Exam:   Constitutional:  Well developed, well nourished, no acute distress, non-toxic appearance   Integument:  Well hydrated, no rash   Lymphatic:  No lymphadenopathy noted   Neurologic:  Alert & oriented x 3  Psychiatric:  Speech and behavior appropriate     Bilateral Hip Exam     Tenderness   The patient is experiencing tenderness in the greater trochanter.  Range of Motion   The patient has normal hip ROM.  Muscle Strength   Abduction: 4/5   Other   Erythema: absent  Sensation: normal  Pulse: present    Greater trochanteric bursitis of left hip    Greater trochanteric bursitis of right hip    Adductor tendinitis of left hip    Other orders  -     methocarbamoL (ROBAXIN) 750 MG Tab; Take 1 tablet (750 mg total) by mouth 4 (four) times daily as needed.  Dispense: 44 tablet; Refill: 3  -     ALPRAZolam (XANAX) 0.5 MG tablet; Take 1 tablet (0.5 mg total) by mouth 3 (three) times daily as needed for Anxiety.  Dispense: 30 tablet; Refill: 0            Using an aseptic technique, I injected 5 cc of lidocaine 1% without and 1 cc of kenalog 40mg into the bilateral Hip and 1:1 in the left adductor. The patient tolerated this well. I will have them return to clinic in 6 weeks.

## 2024-12-03 ENCOUNTER — TELEPHONE (OUTPATIENT)
Dept: ORTHOPEDICS | Facility: CLINIC | Age: 64
End: 2024-12-03
Payer: COMMERCIAL

## 2024-12-12 ENCOUNTER — OFFICE VISIT (OUTPATIENT)
Dept: ORTHOPEDICS | Facility: CLINIC | Age: 64
End: 2024-12-12
Payer: COMMERCIAL

## 2024-12-12 VITALS — WEIGHT: 220 LBS | BODY MASS INDEX: 43.19 KG/M2 | HEIGHT: 60 IN

## 2024-12-12 DIAGNOSIS — M70.62 GREATER TROCHANTERIC BURSITIS OF LEFT HIP: Primary | ICD-10-CM

## 2024-12-12 PROCEDURE — 1159F MED LIST DOCD IN RCRD: CPT | Mod: CPTII,S$GLB,, | Performed by: ORTHOPAEDIC SURGERY

## 2024-12-12 PROCEDURE — 99213 OFFICE O/P EST LOW 20 MIN: CPT | Mod: S$GLB,,, | Performed by: ORTHOPAEDIC SURGERY

## 2024-12-12 PROCEDURE — 1160F RVW MEDS BY RX/DR IN RCRD: CPT | Mod: CPTII,S$GLB,, | Performed by: ORTHOPAEDIC SURGERY

## 2024-12-12 PROCEDURE — 3008F BODY MASS INDEX DOCD: CPT | Mod: CPTII,S$GLB,, | Performed by: ORTHOPAEDIC SURGERY

## 2024-12-12 PROCEDURE — 99999 PR PBB SHADOW E&M-EST. PATIENT-LVL III: CPT | Mod: PBBFAC,,, | Performed by: ORTHOPAEDIC SURGERY

## 2024-12-12 RX ORDER — SCOLOPAMINE TRANSDERMAL SYSTEM 1 MG/1
1 PATCH, EXTENDED RELEASE TRANSDERMAL
Qty: 10 PATCH | Refills: 0 | Status: SHIPPED | OUTPATIENT
Start: 2024-12-12

## 2024-12-12 RX ORDER — ALPRAZOLAM 0.5 MG/1
0.5 TABLET ORAL 4 TIMES DAILY PRN
Qty: 30 TABLET | Refills: 0 | Status: SHIPPED | OUTPATIENT
Start: 2024-12-12 | End: 2025-01-11

## 2024-12-12 RX ORDER — IBUPROFEN 800 MG/1
800 TABLET ORAL 3 TIMES DAILY
Qty: 90 TABLET | Refills: 0 | Status: SHIPPED | OUTPATIENT
Start: 2024-12-12

## 2024-12-12 RX ORDER — ONDANSETRON 8 MG/1
8 TABLET, ORALLY DISINTEGRATING ORAL EVERY 6 HOURS PRN
Qty: 30 TABLET | Refills: 0 | Status: SHIPPED | OUTPATIENT
Start: 2024-12-12

## 2024-12-26 NOTE — PROGRESS NOTES
Chief Complaint   Patient presents with    Left Hip - Pain      HPI:   This is a 64 y.o. who presents to clinic today for left hip pain for the past 1 months after no known trauma. Complains of pain while sleeping on side and when descending stairs. Pain is dull. No numbness or tingling. No associated signs or symptoms.      Past Medical History:   Diagnosis Date    Arthritis     right foot    Hip arthritis     Hypertension     No-show for appointment 2018 Urgent Bilateral upper arm bruising    Postmenopausal hormone replacement therapy      Past Surgical History:   Procedure Laterality Date    ANKLE SURGERY Right 10/2017     SECTION      ENDOMETRIAL ABLATION      HIP SURGERY      JOINT REPLACEMENT Left     Total hip replacement    TUBAL LIGATION       Current Outpatient Medications on File Prior to Visit   Medication Sig Dispense Refill    buPROPion (WELLBUTRIN XL) 300 MG 24 hr tablet Take 300 mg by mouth once daily.      clonazePAM (KLONOPIN) 0.5 MG tablet TAKE ONE TABLET BY MOUTH AT BEDTIME WHEN NEEDED.  30 tablet 0    irbesartan-hydrochlorothiazide (AVALIDE) 300-12.5 mg per tablet Take 1 tablet by mouth once daily. 90 tablet 3    methocarbamoL (ROBAXIN) 750 MG Tab Take 1 tablet (750 mg total) by mouth 4 (four) times daily as needed. 44 tablet 3    meloxicam (MOBIC) 15 MG tablet Take 1 tablet (15 mg total) by mouth once daily. 90 tablet 3    pantoprazole (PROTONIX) 40 MG tablet Take 1 tablet (40 mg total) by mouth once daily. 90 tablet 3    tramadol-acetaminophen 37.5-325 mg (ULTRACET) 37.5-325 mg Tab Take 1 tablet by mouth daily as needed for Pain (can take up to 3 times daily).       Current Facility-Administered Medications on File Prior to Visit   Medication Dose Route Frequency Provider Last Rate Last Admin    acetaminophen tablet 650 mg  650 mg Oral Once PRN Jeansonne, Richard M. Jr., MD        albuterol inhaler 2 puff  2 puff Inhalation Q20 Min PRN Jeansonne, Richard M. Jr., MD         diphenhydrAMINE injection 25 mg  25 mg Intravenous Once PRN Jeansonne, Richard M. Jr., MD        EPINEPHrine injection 0.3 mg  0.3 mg Intramuscular PRN Jeansonne, Richard M. Jr., MD        methylPREDNISolone sodium succinate injection 40 mg  40 mg Intravenous Once PRN Jeansonne, Richard M. Jr., MD        ondansetron disintegrating tablet 4 mg  4 mg Oral Once PRN Jeansonne, Richard M. Jr., MD        sodium chloride 0.9% 500 mL flush bag   Intravenous PRN Jeansonne, Richard M. Jr., MD        sodium chloride 0.9% flush 10 mL  10 mL Intravenous PRN Jeansonne, Richard M. Jr., MD         Review of patient's allergies indicates:   Allergen Reactions    Ace inhibitors Other (See Comments)     Cough      Family History   Problem Relation Name Age of Onset    Anxiety disorder Mother      Heart disease Mother      Hypertension Mother      Anxiety disorder Father      Anesthesia problems Neg Hx      Clotting disorder Neg Hx       Social History     Socioeconomic History    Marital status:    Tobacco Use    Smoking status: Former     Current packs/day: 0.00     Types: Cigarettes     Quit date: 1/10/2017     Years since quittin.9    Smokeless tobacco: Never   Substance and Sexual Activity    Alcohol use: Yes     Alcohol/week: 1.0 standard drink of alcohol     Types: 1 Cans of beer per week     Comment: occ     Social Drivers of Health     Financial Resource Strain: Low Risk  (7/15/2024)    Overall Financial Resource Strain (CARDIA)     Difficulty of Paying Living Expenses: Not hard at all   Food Insecurity: No Food Insecurity (7/15/2024)    Hunger Vital Sign     Worried About Running Out of Food in the Last Year: Never true     Ran Out of Food in the Last Year: Never true   Transportation Needs: No Transportation Needs (8/3/2020)    PRAPARE - Transportation     Lack of Transportation (Medical): No     Lack of Transportation (Non-Medical): No   Physical Activity: Inactive (7/15/2024)    Exercise Vital Sign     Days  of Exercise per Week: 0 days     Minutes of Exercise per Session: 20 min   Stress: Stress Concern Present (7/15/2024)    [employer] - Occupational Stress Questionnaire     Feeling of Stress : To some extent   Housing Stability: Unknown (7/15/2024)    Housing Stability Vital Sign     Unable to Pay for Housing in the Last Year: No       Review of Systems:  Constitutional:  Denies fever or chills   Eyes:  Denies change in visual acuity   HENT:  Denies nasal congestion or sore throat   Respiratory:  Denies cough or shortness of breath   Cardiovascular:  Denies chest pain or edema   GI:  Denies abdominal pain, nausea, vomiting, bloody stools or diarrhea   :  Denies dysuria   Integument:  Denies rash   Neurologic:  Denies headache, focal weakness or sensory changes   Endocrine:  Denies polyuria or polydipsia   Lymphatic:  Denies swollen glands   Psychiatric:  Denies depression or anxiety     Physical Exam:   Constitutional:  Well developed, well nourished, no acute distress, non-toxic appearance   Integument:  Well hydrated, no rash   Lymphatic:  No lymphadenopathy noted   Neurologic:  Alert & oriented x 3  Psychiatric:  Speech and behavior appropriate     Bilateral Hip Exam    right Hip Exam   right hip exam performed same as contralateral hip and is normal.     left Hip Exam   Tenderness   The patient is experiencing tenderness in the greater trochanter.  Range of Motion   The patient has normal hip ROM.  Muscle Strength   Abduction: 4/5   Other   Erythema: absent  Sensation: normal  Pulse: present    X-rays were personally reviewed by me and findings discussed with the patient.  2 views of the left hip show no fractures or dislocations    Greater trochanteric bursitis of left hip  -     ALPRAZolam (XANAX) 0.5 MG tablet; Take 1 tablet (0.5 mg total) by mouth 4 (four) times daily as needed for Anxiety.  Dispense: 30 tablet; Refill: 0    Other orders  -     ondansetron (ZOFRAN-ODT) 8 MG  TbDL; Take 1 tablet (8 mg total) by mouth every 6 (six) hours as needed.  Dispense: 30 tablet; Refill: 0  -     scopolamine (TRANSDERM-SCOP) 1.3-1.5 mg (1 mg over 3 days); Place 1 patch onto the skin every 72 hours.  Dispense: 10 patch; Refill: 0  -     ibuprofen (ADVIL,MOTRIN) 800 MG tablet; Take 1 tablet (800 mg total) by mouth 3 (three) times daily.  Dispense: 90 tablet; Refill: 0         She wishes to wait on injections. RTC 3 weeks.

## 2025-01-02 ENCOUNTER — OFFICE VISIT (OUTPATIENT)
Dept: ORTHOPEDICS | Facility: CLINIC | Age: 65
End: 2025-01-02
Payer: COMMERCIAL

## 2025-01-02 VITALS — BODY MASS INDEX: 43.19 KG/M2 | HEIGHT: 60 IN | WEIGHT: 220 LBS

## 2025-01-02 DIAGNOSIS — M70.62 GREATER TROCHANTERIC BURSITIS OF LEFT HIP: Primary | ICD-10-CM

## 2025-01-02 PROCEDURE — 20610 DRAIN/INJ JOINT/BURSA W/O US: CPT | Mod: LT,S$GLB,, | Performed by: ORTHOPAEDIC SURGERY

## 2025-01-02 PROCEDURE — 99499 UNLISTED E&M SERVICE: CPT | Mod: S$GLB,,, | Performed by: ORTHOPAEDIC SURGERY

## 2025-01-02 PROCEDURE — 99999 PR PBB SHADOW E&M-EST. PATIENT-LVL IV: CPT | Mod: PBBFAC,,, | Performed by: ORTHOPAEDIC SURGERY

## 2025-01-02 RX ADMIN — TRIAMCINOLONE ACETONIDE 40 MG: 40 INJECTION, SUSPENSION INTRA-ARTICULAR; INTRAMUSCULAR at 08:01

## 2025-01-10 RX ORDER — TRIAMCINOLONE ACETONIDE 40 MG/ML
40 INJECTION, SUSPENSION INTRA-ARTICULAR; INTRAMUSCULAR
Status: DISCONTINUED | OUTPATIENT
Start: 2025-01-02 | End: 2025-01-10 | Stop reason: HOSPADM

## 2025-01-10 NOTE — PROGRESS NOTES
Chief Complaint   Patient presents with    Left Hip - Pain     Left hip pain      HPI:   This is a 64 y.o. who presents to clinic today for left hip pain for the past 1 weeks after no known trauma. Complains of pain while sleeping on side and when descending stairs. Pain is dull. No numbness or tingling. No associated signs or symptoms.      Past Medical History:   Diagnosis Date    Arthritis     right foot    Hip arthritis     Hypertension     No-show for appointment 2018 Urgent Bilateral upper arm bruising    Postmenopausal hormone replacement therapy      Past Surgical History:   Procedure Laterality Date    ANKLE SURGERY Right 10/2017     SECTION      ENDOMETRIAL ABLATION      HIP SURGERY      JOINT REPLACEMENT Left     Total hip replacement    TUBAL LIGATION       Current Outpatient Medications on File Prior to Visit   Medication Sig Dispense Refill    ALPRAZolam (XANAX) 0.5 MG tablet Take 1 tablet (0.5 mg total) by mouth 4 (four) times daily as needed for Anxiety. 30 tablet 0    buPROPion (WELLBUTRIN XL) 300 MG 24 hr tablet Take 300 mg by mouth once daily.      clonazePAM (KLONOPIN) 0.5 MG tablet TAKE ONE TABLET BY MOUTH AT BEDTIME WHEN NEEDED.  30 tablet 0    ibuprofen (ADVIL,MOTRIN) 800 MG tablet Take 1 tablet (800 mg total) by mouth 3 (three) times daily. 90 tablet 0    irbesartan-hydrochlorothiazide (AVALIDE) 300-12.5 mg per tablet Take 1 tablet by mouth once daily. 90 tablet 3    methocarbamoL (ROBAXIN) 750 MG Tab Take 1 tablet (750 mg total) by mouth 4 (four) times daily as needed. 44 tablet 3    ondansetron (ZOFRAN-ODT) 8 MG TbDL Take 1 tablet (8 mg total) by mouth every 6 (six) hours as needed. 30 tablet 0    scopolamine (TRANSDERM-SCOP) 1.3-1.5 mg (1 mg over 3 days) Place 1 patch onto the skin every 72 hours. 10 patch 0    meloxicam (MOBIC) 15 MG tablet Take 1 tablet (15 mg total) by mouth once daily. 90 tablet 3    pantoprazole (PROTONIX) 40 MG tablet Take 1 tablet (40 mg  total) by mouth once daily. 90 tablet 3    tramadol-acetaminophen 37.5-325 mg (ULTRACET) 37.5-325 mg Tab Take 1 tablet by mouth daily as needed for Pain (can take up to 3 times daily).       Current Facility-Administered Medications on File Prior to Visit   Medication Dose Route Frequency Provider Last Rate Last Admin    acetaminophen tablet 650 mg  650 mg Oral Once PRN Jeansonne, Richard M. Jr., MD        albuterol inhaler 2 puff  2 puff Inhalation Q20 Min PRN Jeansonne, Richard M. Jr., MD        diphenhydrAMINE injection 25 mg  25 mg Intravenous Once PRN Jeansonne, Richard M. Jr., MD        EPINEPHrine injection 0.3 mg  0.3 mg Intramuscular PRN Jeansonne, Richard M. Jr., MD        methylPREDNISolone sodium succinate injection 40 mg  40 mg Intravenous Once PRN Jeansonne, Richard M. Jr., MD        ondansetron disintegrating tablet 4 mg  4 mg Oral Once PRN Jeansonne, Richard M. Jr., MD        sodium chloride 0.9% 500 mL flush bag   Intravenous PRN Jeansonne, Richard M. Jr., MD        sodium chloride 0.9% flush 10 mL  10 mL Intravenous PRN Jeansonne, Richard M. Jr., MD         Review of patient's allergies indicates:   Allergen Reactions    Ace inhibitors Other (See Comments)     Cough      Family History   Problem Relation Name Age of Onset    Anxiety disorder Mother      Heart disease Mother      Hypertension Mother      Anxiety disorder Father      Anesthesia problems Neg Hx      Clotting disorder Neg Hx       Social History     Socioeconomic History    Marital status:    Tobacco Use    Smoking status: Former     Current packs/day: 0.00     Types: Cigarettes     Quit date: 1/10/2017     Years since quittin.0    Smokeless tobacco: Never   Substance and Sexual Activity    Alcohol use: Yes     Alcohol/week: 1.0 standard drink of alcohol     Types: 1 Cans of beer per week     Comment: occ     Social Drivers of Health     Financial Resource Strain: Low Risk  (7/15/2024)    Overall Financial Resource Strain  (CARDIA)     Difficulty of Paying Living Expenses: Not hard at all   Food Insecurity: No Food Insecurity (7/15/2024)    Hunger Vital Sign     Worried About Running Out of Food in the Last Year: Never true     Ran Out of Food in the Last Year: Never true   Transportation Needs: No Transportation Needs (8/3/2020)    PRAPARE - Transportation     Lack of Transportation (Medical): No     Lack of Transportation (Non-Medical): No   Physical Activity: Inactive (7/15/2024)    Exercise Vital Sign     Days of Exercise per Week: 0 days     Minutes of Exercise per Session: 20 min   Stress: Stress Concern Present (7/15/2024)    [employer] - Occupational Stress Questionnaire     Feeling of Stress : To some extent   Housing Stability: Unknown (7/15/2024)    Housing Stability Vital Sign     Unable to Pay for Housing in the Last Year: No       Review of Systems:  Constitutional:  Denies fever or chills   Eyes:  Denies change in visual acuity   HENT:  Denies nasal congestion or sore throat   Respiratory:  Denies cough or shortness of breath   Cardiovascular:  Denies chest pain or edema   GI:  Denies abdominal pain, nausea, vomiting, bloody stools or diarrhea   :  Denies dysuria   Integument:  Denies rash   Neurologic:  Denies headache, focal weakness or sensory changes   Endocrine:  Denies polyuria or polydipsia   Lymphatic:  Denies swollen glands   Psychiatric:  Denies depression or anxiety     Physical Exam:   Constitutional:  Well developed, well nourished, no acute distress, non-toxic appearance   Integument:  Well hydrated, no rash   Lymphatic:  No lymphadenopathy noted   Neurologic:  Alert & oriented x 3  Psychiatric:  Speech and behavior appropriate     Bilateral Hip Exam    right Hip Exam   right hip exam performed same as contralateral hip and is normal.     left Hip Exam   Tenderness   The patient is experiencing tenderness in the greater trochanter.  Range of Motion   The patient has normal  hip ROM.  Muscle Strength   Abduction: 4/5   Other   Erythema: absent  Sensation: normal  Pulse: present    Greater trochanteric bursitis of left hip           Using an aseptic technique, I injected 5 cc of lidocaine 1% without and 1 cc of kenalog 40mg into the left Hip. The patient tolerated this well. I will have them return to clinic in 6 weeks.

## 2025-01-10 NOTE — PROCEDURES
Large Joint Aspiration/Injection: L greater trochanteric bursa    Date/Time: 1/2/2025 8:30 AM    Performed by: Carter Elliott MD  Authorized by: Carter Elliott MD    Consent Done?:  Yes (Verbal)  Indications:  Pain  Timeout: prior to procedure the correct patient, procedure, and site was verified    Prep: patient was prepped and draped in usual sterile fashion    Local anesthetic:  Lidocaine 1% without epinephrine  Anesthetic total (ml):  5      Details:  Needle Size:  21 G  Approach:  Lateral  Location:  Hip  Site:  L greater trochanteric bursa  Medications:  40 mg triamcinolone acetonide 40 mg/mL  Patient tolerance:  Patient tolerated the procedure well with no immediate complications

## 2025-06-30 DIAGNOSIS — M70.62 GREATER TROCHANTERIC BURSITIS OF LEFT HIP: Primary | ICD-10-CM

## (undated) DEVICE — UNDERGLOVE BIOGEL PI SZ 6.5 LF

## (undated) DEVICE — SUT MONOCRYL 2-0 S UND

## (undated) DEVICE — DRAPE PLASTIC U 60X72

## (undated) DEVICE — SEE MEDLINE ITEM 157131

## (undated) DEVICE — NDL SPINAL 18GX3.5 SPINOCAN

## (undated) DEVICE — SEE MEDLINE ITEM 146313

## (undated) DEVICE — SOL IRR NACL .9% 3000ML

## (undated) DEVICE — SEE MEDLINE ITEM 146298

## (undated) DEVICE — PENCIL ROCKER SWITCH 10FT CORD

## (undated) DEVICE — SEE MEDLINE ITEM 152622

## (undated) DEVICE — GAUZE SPONGE 4X4 12PLY

## (undated) DEVICE — DRAPE STERI U-SHAPED 47X51IN

## (undated) DEVICE — STRAP ANKLE ARTHSCP DSRCTOR

## (undated) DEVICE — Device

## (undated) DEVICE — BLADE SURG CARBON STEEL SZ11

## (undated) DEVICE — TOURNIQUET SB QC DP 24X4IN

## (undated) DEVICE — SEE MEDLINE ITEM 157117

## (undated) DEVICE — SET IRR URLGY 2LINE UNIV SPIKE

## (undated) DEVICE — DURAPREP SURG SCRUB 26ML

## (undated) DEVICE — ALCOHOL 70% ISOP RUBBING 4OZ

## (undated) DEVICE — DRAPE EXTREMITY W/ABC NON-SLIP

## (undated) DEVICE — SUT ETHILON 3-0 PS2 18 BLK

## (undated) DEVICE — SUT MONOCRYL 3-0 SH U/D

## (undated) DEVICE — NDL HYPO A BEVEL 22X1 1/2

## (undated) DEVICE — ELECTRODE REM PLYHSV RETURN 9

## (undated) DEVICE — SYR 10CC LUER LOCK

## (undated) DEVICE — BANDAGE ESMARK 6X12